# Patient Record
Sex: FEMALE | Race: WHITE | NOT HISPANIC OR LATINO | Employment: OTHER | URBAN - METROPOLITAN AREA
[De-identification: names, ages, dates, MRNs, and addresses within clinical notes are randomized per-mention and may not be internally consistent; named-entity substitution may affect disease eponyms.]

---

## 2021-12-03 ENCOUNTER — OFFICE VISIT (OUTPATIENT)
Dept: ENDOCRINOLOGY | Facility: HOSPITAL | Age: 66
End: 2021-12-03
Payer: MEDICARE

## 2021-12-03 VITALS
HEIGHT: 61 IN | DIASTOLIC BLOOD PRESSURE: 80 MMHG | BODY MASS INDEX: 28.81 KG/M2 | SYSTOLIC BLOOD PRESSURE: 120 MMHG | WEIGHT: 152.6 LBS | HEART RATE: 74 BPM

## 2021-12-03 DIAGNOSIS — E04.1 RIGHT THYROID NODULE: ICD-10-CM

## 2021-12-03 DIAGNOSIS — E04.2 GOITER, NONTOXIC, MULTINODULAR: ICD-10-CM

## 2021-12-03 DIAGNOSIS — E11.42 DIABETIC POLYNEUROPATHY ASSOCIATED WITH TYPE 2 DIABETES MELLITUS (HCC): ICD-10-CM

## 2021-12-03 DIAGNOSIS — E83.52 HYPERCALCEMIA: ICD-10-CM

## 2021-12-03 DIAGNOSIS — E11.9 TYPE 2 DIABETES MELLITUS WITHOUT COMPLICATION, WITHOUT LONG-TERM CURRENT USE OF INSULIN (HCC): Primary | ICD-10-CM

## 2021-12-03 PROCEDURE — 99205 OFFICE O/P NEW HI 60 MIN: CPT | Performed by: INTERNAL MEDICINE

## 2021-12-03 RX ORDER — MULTIVIT WITH MINERALS/LUTEIN
1000 TABLET ORAL DAILY
COMMUNITY

## 2021-12-07 ENCOUNTER — OFFICE VISIT (OUTPATIENT)
Dept: DIABETES SERVICES | Facility: HOSPITAL | Age: 66
End: 2021-12-07
Payer: MEDICARE

## 2021-12-07 VITALS — BODY MASS INDEX: 27.97 KG/M2 | WEIGHT: 152 LBS | HEIGHT: 62 IN

## 2021-12-07 DIAGNOSIS — E04.2 GOITER, NONTOXIC, MULTINODULAR: ICD-10-CM

## 2021-12-07 DIAGNOSIS — E04.1 RIGHT THYROID NODULE: ICD-10-CM

## 2021-12-07 DIAGNOSIS — E11.42 DIABETIC POLYNEUROPATHY ASSOCIATED WITH TYPE 2 DIABETES MELLITUS (HCC): ICD-10-CM

## 2021-12-07 DIAGNOSIS — E83.52 HYPERCALCEMIA: ICD-10-CM

## 2021-12-07 DIAGNOSIS — E11.9 TYPE 2 DIABETES MELLITUS WITHOUT COMPLICATION, WITHOUT LONG-TERM CURRENT USE OF INSULIN (HCC): Primary | ICD-10-CM

## 2021-12-07 PROCEDURE — G0108 DIAB MANAGE TRN  PER INDIV: HCPCS | Performed by: DIETITIAN, REGISTERED

## 2021-12-14 DIAGNOSIS — E11.9 TYPE 2 DIABETES MELLITUS WITHOUT COMPLICATION, WITHOUT LONG-TERM CURRENT USE OF INSULIN (HCC): Primary | ICD-10-CM

## 2021-12-14 RX ORDER — BLOOD-GLUCOSE METER
EACH MISCELLANEOUS
Qty: 1 KIT | Refills: 0 | Status: SHIPPED | OUTPATIENT
Start: 2021-12-14

## 2021-12-14 RX ORDER — LANCETS 30 GAUGE
EACH MISCELLANEOUS
Qty: 100 EACH | Refills: 3 | Status: SHIPPED | OUTPATIENT
Start: 2021-12-14

## 2022-01-27 ENCOUNTER — OFFICE VISIT (OUTPATIENT)
Dept: DIABETES SERVICES | Facility: HOSPITAL | Age: 67
End: 2022-01-27
Payer: MEDICARE

## 2022-01-27 VITALS — WEIGHT: 152 LBS | HEIGHT: 61 IN | BODY MASS INDEX: 28.7 KG/M2

## 2022-01-27 DIAGNOSIS — E11.9 TYPE 2 DIABETES MELLITUS WITHOUT COMPLICATION, WITHOUT LONG-TERM CURRENT USE OF INSULIN (HCC): Primary | ICD-10-CM

## 2022-01-27 PROCEDURE — 97802 MEDICAL NUTRITION INDIV IN: CPT | Performed by: DIETITIAN, REGISTERED

## 2022-01-27 NOTE — PROGRESS NOTES
Medical Nutrition Therapy     Assessment    Visit Type: Initial visit  Chief complaint:  Type 2 Diabetes     HPI:  Met with Autumn Jay for initial medical nutrition therapy visit  Since our diabetes visit, she has started checking her blood sugars, doing some pair testing  Has a handful of days for me to work with  Fasting blood sugars 110-130, before lunch and before dinner often less than 100, after meal blood sugars tend to be higher after breakfast and occasionally after dinner  Blood sugars significantly improved since a few months ago when she was on steroids and having infections  Food recall shows primary issues:  Autumn Jay does not eat overly large portions, but carbohydrate intake very significantly throughout the day  Discussed the benefit of consistent carb intake to steady blood sugars  She seems to grasp the concept of this but making changes to some of her meals may be a challenge  Together we discussed what foods contain CHO, reading a food label, serving sizes, and set a carb goal of 30-45g CHO/meal to promote weight loss with 15g snacks  Put together sample meals for Valerie's reference and evaluated Valerie's current eating plan  Good understanding, Autumn Jay will call with questions or for more education  Follow up as needed if not improving  Ht Readings from Last 1 Encounters:   01/27/22 5' 1" (1 549 m)     Wt Readings from Last 3 Encounters:   01/27/22 68 9 kg (152 lb)   12/07/21 68 9 kg (152 lb)   12/03/21 69 2 kg (152 lb 9 6 oz)        Body mass index is 28 72 kg/m²       Lab Results   Component Value Date    HGBA1C 7 6 11/29/2021       No results found for: CHOL  No results found for: HDL  No results found for: LDLCALC  No results found for: TRIG  No results found for: CHOLHDL    Weight Change: No    Medical Diagnosis/ICD 10 Code:  E11 9    Barriers to Learning: no barriers    Do you follow any special diet presently?: No  Who shops: patient  Who cooks: patient    Food Log: Completed via the method of food recall    Breakfast: up around 7:30, eats 8  Oatmeal with raisins and milk and coffee; eggs 1 toast and coffee; cream of wheat and coffee   Morning Snack: more coffee sometimes with a lot of milk  Lunch: 1:30pm: 1 rye bread larger slice with lunchmeat, tomato sliced and tea or water  peanbut butter on rye with apple on it  Afternoon Snack: not much   Dinner: 6pm: open face burger with vegetable  Chicken with veggies, might not always have a starch  Sometimes pasta with red sauce main dish  Evening Snack: nuts and raisins a little, sometimes nothing  Beverages: water, green tea black, coffee in the morning 2 big cups, no milk, no soda, no juice,   Eating out/Take out: not out much   Exercise ADL, not now in the cold, in the nice weather will walk 4,000 steps  Nutrition Diagnosis:  Food and nutrition related knowledge deficit  related to Lack of prior exposure to accurate nutrition related information as evidenced by Verbalizes inaccurate or incomplete information    Intervention: plate method, label reading, behavior modification strategies, carbohydrate counting, individualized meal plan and monitoring portion control     Treatment Goals: Patient understands education and recommendations    Education Material Given  Terri Garcia was provided the Portion Booklet and Planning Healthy Meals     Monitoring and evaluation:    Term code indicator   1 6 3 Carbohydrate Intake Criteria: 15-30g CHO per meal, 0-15g CHO snacks    Patients Response to Instruction:  Simona Lion  Expected Compliancegood    Thank you for coming to the [place] [address] for education today  Please feel free to call with any questions or concerns      Brie Sanz, [address] [place] [address] 20  29 [facility] [id number]

## 2022-04-05 LAB
ALBUMIN SERPL-MCNC: 4.4 G/DL (ref 3.8–4.8)
ALBUMIN/CREAT UR: <12 MG/G CREAT (ref 0–29)
ALBUMIN/GLOB SERPL: 1.8 {RATIO} (ref 1.2–2.2)
ALP SERPL-CCNC: 127 IU/L (ref 44–121)
ALT SERPL-CCNC: 23 IU/L (ref 0–32)
AST SERPL-CCNC: 29 IU/L (ref 0–40)
BILIRUB SERPL-MCNC: 0.7 MG/DL (ref 0–1.2)
BUN SERPL-MCNC: 17 MG/DL (ref 8–27)
BUN/CREAT SERPL: 20 (ref 12–28)
C PEPTIDE SERPL-MCNC: 1.2 NG/ML (ref 1.1–4.4)
CALCIUM SERPL-MCNC: 10.2 MG/DL (ref 8.7–10.3)
CHLORIDE SERPL-SCNC: 103 MMOL/L (ref 96–106)
CO2 SERPL-SCNC: 22 MMOL/L (ref 20–29)
CREAT SERPL-MCNC: 0.86 MG/DL (ref 0.57–1)
CREAT UR-MCNC: 24.1 MG/DL
EGFR: 74 ML/MIN/1.73
EST. AVERAGE GLUCOSE BLD GHB EST-MCNC: 123 MG/DL
GLOBULIN SER-MCNC: 2.5 G/DL (ref 1.5–4.5)
GLUCOSE SERPL-MCNC: 127 MG/DL (ref 65–99)
HBA1C MFR BLD: 5.9 % (ref 4.8–5.6)
MICROALBUMIN UR-MCNC: <3 UG/ML
PHOSPHATE SERPL-MCNC: 3.4 MG/DL (ref 3–4.3)
POTASSIUM SERPL-SCNC: 4.4 MMOL/L (ref 3.5–5.2)
PROT SERPL-MCNC: 6.9 G/DL (ref 6–8.5)
PTH-INTACT SERPL-MCNC: 42 PG/ML (ref 15–65)
SODIUM SERPL-SCNC: 140 MMOL/L (ref 134–144)

## 2022-04-08 ENCOUNTER — OFFICE VISIT (OUTPATIENT)
Dept: ENDOCRINOLOGY | Facility: HOSPITAL | Age: 67
End: 2022-04-08
Payer: MEDICARE

## 2022-04-08 VITALS
OXYGEN SATURATION: 99 % | BODY MASS INDEX: 27.6 KG/M2 | SYSTOLIC BLOOD PRESSURE: 110 MMHG | DIASTOLIC BLOOD PRESSURE: 78 MMHG | WEIGHT: 146.2 LBS | HEIGHT: 61 IN | HEART RATE: 78 BPM

## 2022-04-08 DIAGNOSIS — E04.2 GOITER, NONTOXIC, MULTINODULAR: ICD-10-CM

## 2022-04-08 DIAGNOSIS — E11.42 DIABETIC POLYNEUROPATHY ASSOCIATED WITH TYPE 2 DIABETES MELLITUS (HCC): ICD-10-CM

## 2022-04-08 DIAGNOSIS — E04.1 RIGHT THYROID NODULE: ICD-10-CM

## 2022-04-08 DIAGNOSIS — E83.52 HYPERCALCEMIA: ICD-10-CM

## 2022-04-08 DIAGNOSIS — E11.9 TYPE 2 DIABETES MELLITUS WITHOUT COMPLICATION, WITHOUT LONG-TERM CURRENT USE OF INSULIN (HCC): Primary | ICD-10-CM

## 2022-04-08 PROCEDURE — 99215 OFFICE O/P EST HI 40 MIN: CPT | Performed by: INTERNAL MEDICINE

## 2022-04-08 RX ORDER — PROPRANOLOL/HYDROCHLOROTHIAZID 40 MG-25MG
TABLET ORAL
COMMUNITY

## 2022-04-08 NOTE — PATIENT INSTRUCTIONS
hgba1c 5 9%, this is excellent ,   For now, no change in metformin  Continue to follow  Blood sugars  Continue to follow the diabetic diet  The calcium levels are normal now, we'll follow it over time  It is ok to follow up with the eye doctor  Follow up in 3 months with blood work

## 2022-04-08 NOTE — PROGRESS NOTES
4/8/2022    Assessment/Plan      Diagnoses and all orders for this visit:    Type 2 diabetes mellitus without complication, without long-term current use of insulin (HCC)  -     metFORMIN (GLUCOPHAGE) 500 mg tablet; Take 1 tablet (500 mg total) by mouth 2 (two) times a day with meals  -     HEMOGLOBIN A1C W/ EAG ESTIMATION Lab Collect; Future  -     Comprehensive metabolic panel Lab Collect; Future  -     Phosphorus Lab Collect; Future    Diabetic polyneuropathy associated with type 2 diabetes mellitus (HCC)  -     HEMOGLOBIN A1C W/ EAG ESTIMATION Lab Collect; Future  -     Comprehensive metabolic panel Lab Collect; Future  -     Phosphorus Lab Collect; Future    Goiter, nontoxic, multinodular  -     HEMOGLOBIN A1C W/ EAG ESTIMATION Lab Collect; Future  -     Comprehensive metabolic panel Lab Collect; Future  -     Phosphorus Lab Collect; Future    Right thyroid nodule  -     HEMOGLOBIN A1C W/ EAG ESTIMATION Lab Collect; Future  -     Comprehensive metabolic panel Lab Collect; Future  -     Phosphorus Lab Collect; Future    Hypercalcemia  -     HEMOGLOBIN A1C W/ EAG ESTIMATION Lab Collect; Future  -     Comprehensive metabolic panel Lab Collect; Future  -     Phosphorus Lab Collect; Future    Other orders  -     Glucosamine-Chondroitin (GLUCOSAMINE CHONDR COMPLEX PO); Take by mouth  -     Turmeric 500 MG CAPS; Take by mouth        Assessment/Plan:  1  Type 2 diabetes  Hemoglobin A1c is 5 9%  This does demonstrate excellent control of her diabetes  She has done remarkably well with her dietary changes and use of low-dose metformin  For now, she will continue the same metformin  mg twice a day  She will continue to work on dietary changes and follow her blood sugars with paired testing  If her hemoglobin A1c remains this low with her next visit, we may be able to consider decreasing her metformin    She has been cleared to follow-up with the ophthalmologist for diabetic eye exam now that her blood sugars are doing much better  2  Diabetic neuropathy  She denies neuropathic symptoms  Diabetic foot exams are up-to-date  3  Nontoxic multinodular goiter with right dominant thyroid nodule  She is post biopsy of a large right-sided thyroid nodule that was biopsy benign  She has a smaller T rads 5 nodule  Repeat thyroid ultrasound will be required in the fall 2022  4  Hypercalcemia  Most recent blood work does show normalization of her calcium with normal parathyroid hormone level  This will be followed over time  I have asked her to follow up in 3 months with preceding hemoglobin A1c, CMP, and phosphorus level  CC: Diabetes type 2, thyroid, calcium follow-up    History of Present Illness     HPI: Dante Mack is a 74y o  year old female with type 2 diabetes neuropathy for 6 months, multinodular goiter, hypercalcemia follow-up  Diabetes occurred after 40% of her pancreas removed, both the body 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 for which another 13 tumors removed in July 2021  She is currently on octreotide once a month  She is on oral agents at home and takes metformin  mg twice a day  She denies any polyuria, polydipsia, and blurry vision  She has some hunger at times and 1-3 times per night nocturia  She denies numbness or tingling of the feet  She denies chest pain or shortness of breath  She denies nephropathy, retinopathy, heart attack, stroke and claudication but does admit to neuropathy  Hypoglycemic episodes: No rare  The patient's last eye exam was many years ago  The patient's last foot exam was in December 2021 at endocrine office visit  Last A1C was   Lab Results   Component Value Date    HGBA1C 5 9 (H) 04/04/2022     Blood Sugar/Glucometer/Pump/CGM review:  Blood sugars are checked up to 4 times a day    She is doing para testing before and several hours after a meal   Blood sugars overall range from 70s to the mid 100s with rare blood sugar readings above 150 or below 70  She was in [facility] for 1 month, not as good with checking sugars there  She has a history of a nontoxic multinodular goiter  Was a right-sided dominant thyroid nodule  This was noted on ultrasound after workup post a neuroendocrine tumor of the pancreas in 2008 with 40% of her pancreas being removed and a PET scan was done in follow-up noting uptake in the thyroid  This occurred in November 2021 when thyroid nodule was discovered  Follicular lesion [place] class 3 with Afirma testing benign was the reports of the T rad 4 3 1 cm right-sided thyroid nodule pathology  There is a small T rads 5 nodule measuring less than 1 cm in the thyroid also  She denies heat or cold intolerance but can be very cold in bed  She denies palpitation, tremors, diarrhea or constipation  She does have insomnia with trouble falling asleep getting back to sleep  She has no fatigue or weight changes  She denies compressive thyroid symptoms or difficulties with swallowing  She has a history of mild hypercalcemia for which thyroid ultrasound did  demonstrate inferior to the left lobe of the thyroid possible parathyroid adenoma  Review of Systems   Constitutional: Negative for fatigue and unexpected weight change  HENT: Negative for trouble swallowing  Eyes: Positive for visual disturbance  Wears reading glasses  Has blurry vision  Respiratory: Negative for chest tightness and shortness of breath  Cardiovascular: Positive for leg swelling  Negative for chest pain and palpitations  Some edema of the feet, left greater than the right  Gastrointestinal: Negative for abdominal pain, constipation, diarrhea and nausea  Will get abdominal symptoms with octreotide and loose bowels and has rectocele  Endocrine: Positive for polyphagia  Negative for cold intolerance, heat intolerance, polydipsia and polyuria          Nocturia once or twice a night  Some hungry at times  Gets cold at night in bed especially hands and feet  Skin: Negative for rash and wound  Neurological: Negative for dizziness, tremors, weakness, light-headedness, numbness and headaches  Psychiatric/Behavioral: Positive for sleep disturbance  Some trouble falling asleep or fall back to sleep if awakens          Historical Information   Past Medical History:   Diagnosis Date    H  pylori infection     Neuroendocrine carcinoma ([facility] [address] ) 2008    pancreas with 40% bodya dn tail removed    Rectocele      Past Surgical History:   Procedure Laterality Date    CHOLECYSTECTOMY  07/2021    with liver surgery    DISTAL PANCREATECTOMY  2008    body and tail removed, 40%    LIVER SURGERY  07/2021    13 liver mets rremoved from pancreatic cancer    TUBAL LIGATION Bilateral     UMBILICAL HERNIA REPAIR  07/2021    with liver surgery     Social History   Social History     Substance and Sexual Activity   Alcohol Use Yes    Comment: rare glass of wine     Social History     Substance and Sexual Activity   Drug Use Never     Social History     Tobacco Use   Smoking Status Never Smoker   Smokeless Tobacco Never Used     Family History:   Family History   Problem Relation Age of Onset    Atrial fibrillation Mother     Retinal detachment Mother     Alcohol abuse Father     Kidney nephrosis Father     No Known Problems Sister     Atrial fibrillation Brother     Cervical cancer Maternal Grandmother     Cervical cancer Paternal Grandmother     Obesity Son     Diabetes type II Son     No Known Problems Son     No Known Problems Daughter     Retinal detachment Sister        Meds/Allergies   Current Outpatient Medications   Medication Sig Dispense Refill    Ascorbic Acid (vitamin C) 1000 MG tablet Take 1,000 mg by mouth daily      Blood Glucose Monitoring Suppl (OneTouch Verio) w/Device KIT Use to test blood sugars once daily 1 kit 0    Glucosamine-Chondroitin (GLUCOSAMINE CHONDR COMPLEX PO) Take by mouth      glucose blood test strip Use  To test blood sugars once daily 100 strip 3    metFORMIN (GLUCOPHAGE) 500 mg tablet Take 1 tablet (500 mg total) by mouth 2 (two) times a day with meals 180 tablet 3    Multiple Vitamins-Minerals (CENTRUM SILVER 50+WOMEN PO) Take 1 tablet by mouth daily      octreotide (SandoSTATIN LAR DEPOT) 30 mg IM injection kit Inject 30 mg into a muscle every 28 days      OneTouch Delica Lancets 16Y MISC Use to test blood sugars once daily 100 each 3    Probiotic Product (PROBIOTIC-10 PO) Take 1 tablet by mouth daily      Turmeric 500 MG CAPS Take by mouth       No current facility-administered medications for this visit  Allergies   Allergen Reactions    Levaquin [Levofloxacin] Other (See Comments)     Tendons       Objective   Vitals: Blood pressure 110/78, pulse 78, height 5' 1" (1 549 m), weight 66 3 kg (146 lb 3 2 oz), SpO2 99 %  Invasive Devices  Report    None                 Physical Exam  Vitals reviewed  Constitutional:       Appearance: Normal appearance  She is well-developed  HENT:      Head: Normocephalic and atraumatic  Eyes:      Extraocular Movements: Extraocular movements intact  Conjunctiva/sclera: Conjunctivae normal       Comments: No lid lag, stare, proptosis, periorbital edema  Neck:      Thyroid: No thyromegaly  Vascular: No carotid bruit  Comments: 3 cm right thyroid nodule palpable  No bruits over the thyroid gland  Cardiovascular:      Rate and Rhythm: Normal rate and regular rhythm  Heart sounds: Normal heart sounds  No murmur heard  Pulmonary:      Effort: Pulmonary effort is normal       Breath sounds: Normal breath sounds  No wheezing  Abdominal:      Palpations: Abdomen is soft  Musculoskeletal:         General: No deformity  Normal range of motion  Cervical back: Normal range of motion and neck supple  Right lower leg: No edema        Left lower leg: No edema  Comments: No tremor of the outstretched hands  Lymphadenopathy:      Cervical: No cervical adenopathy  Skin:     General: Skin is warm and dry  Findings: No rash  Neurological:      Mental Status: She is alert and oriented to person, place, and time  Deep Tendon Reflexes: Reflexes are normal and symmetric  Comments: Patellar deep tendon reflexes normal          The history was obtained from the review of the chart and from the patient and   Lab Results:    Most recent Alc is  Lab Results   Component Value Date    HGBA1C 5 9 (H) 04/04/2022           CMP done on 4/4/2022 shows a fasting glucose of 127, calcium of 10 2 with albumin of 4 4,  and was otherwise normal       Fasting C-Peptide level is 1 2  Phosphorus is 3 4  Intact parathyroid hormone level is 42  Lab Results   Component Value Date    CREATININE 0 86 04/04/2022    BUN 17 04/04/2022    K 4 4 04/04/2022     04/04/2022    CO2 22 04/04/2022     eGFR   Date Value Ref Range Status   04/04/2022 74 >59 mL/min/1 73 Final     Lab Results   Component Value Date    ALT 23 04/04/2022    AST 29 04/04/2022       Urine microalbumin to creatinine ratio is less than 12        Future Appointments   Date Time Provider Arslan Baez   7/27/2022 10:20 AM Carey Jean-Baptiste MD [facility]

## 2022-04-25 LAB
LEFT EYE DIABETIC RETINOPATHY: NORMAL
RIGHT EYE DIABETIC RETINOPATHY: NORMAL

## 2022-07-23 LAB
ALBUMIN SERPL-MCNC: 4.5 G/DL (ref 3.8–4.8)
ALBUMIN/GLOB SERPL: 2 {RATIO} (ref 1.2–2.2)
ALP SERPL-CCNC: 111 IU/L (ref 44–121)
ALT SERPL-CCNC: 22 IU/L (ref 0–32)
AST SERPL-CCNC: 28 IU/L (ref 0–40)
BILIRUB SERPL-MCNC: 0.4 MG/DL (ref 0–1.2)
BUN SERPL-MCNC: 22 MG/DL (ref 8–27)
BUN/CREAT SERPL: 26 (ref 12–28)
CALCIUM SERPL-MCNC: 10.4 MG/DL (ref 8.7–10.3)
CHLORIDE SERPL-SCNC: 104 MMOL/L (ref 96–106)
CO2 SERPL-SCNC: 27 MMOL/L (ref 20–29)
CREAT SERPL-MCNC: 0.85 MG/DL (ref 0.57–1)
EGFR: 75 ML/MIN/1.73
GLOBULIN SER-MCNC: 2.3 G/DL (ref 1.5–4.5)
GLUCOSE SERPL-MCNC: 116 MG/DL (ref 65–99)
HBA1C MFR BLD: 6 % (ref 4.8–5.6)
PHOSPHATE SERPL-MCNC: 3.4 MG/DL (ref 3–4.3)
POTASSIUM SERPL-SCNC: 5.1 MMOL/L (ref 3.5–5.2)
PROT SERPL-MCNC: 6.8 G/DL (ref 6–8.5)
SODIUM SERPL-SCNC: 144 MMOL/L (ref 134–144)

## 2022-07-27 ENCOUNTER — TELEPHONE (OUTPATIENT)
Dept: ADMINISTRATIVE | Facility: OTHER | Age: 67
End: 2022-07-27

## 2022-07-27 ENCOUNTER — OFFICE VISIT (OUTPATIENT)
Dept: ENDOCRINOLOGY | Facility: HOSPITAL | Age: 67
End: 2022-07-27
Payer: MEDICARE

## 2022-07-27 VITALS
SYSTOLIC BLOOD PRESSURE: 120 MMHG | HEIGHT: 61 IN | HEART RATE: 80 BPM | BODY MASS INDEX: 27.38 KG/M2 | DIASTOLIC BLOOD PRESSURE: 72 MMHG | WEIGHT: 145 LBS

## 2022-07-27 DIAGNOSIS — E11.42 DIABETIC POLYNEUROPATHY ASSOCIATED WITH TYPE 2 DIABETES MELLITUS (HCC): ICD-10-CM

## 2022-07-27 DIAGNOSIS — E11.9 TYPE 2 DIABETES MELLITUS WITHOUT COMPLICATION, WITHOUT LONG-TERM CURRENT USE OF INSULIN (HCC): Primary | ICD-10-CM

## 2022-07-27 DIAGNOSIS — E83.52 HYPERCALCEMIA: ICD-10-CM

## 2022-07-27 DIAGNOSIS — E04.1 RIGHT THYROID NODULE: ICD-10-CM

## 2022-07-27 DIAGNOSIS — E04.2 GOITER, NONTOXIC, MULTINODULAR: ICD-10-CM

## 2022-07-27 PROCEDURE — 99214 OFFICE O/P EST MOD 30 MIN: CPT | Performed by: INTERNAL MEDICINE

## 2022-07-27 NOTE — TELEPHONE ENCOUNTER
----- Message from Fred Banks sent at 7/27/2022 10:38 AM EDT -----  Regarding: eye exam  07/27/22 10:39 AM    Hello, our patient Mariam Beasley has had and Eye exam completed in May 2022  Please assist in obtaining the exclusion documentation by making an External outreach to Dr Woodrow Gr MD located in [address]       Thank you,  Manish Khan   [facility]

## 2022-07-27 NOTE — TELEPHONE ENCOUNTER
Upon review of the In Basket request and the patient's chart, initial outreach has been made via fax, please see Contacts section for details       Thank you  Genoveva Naranjo

## 2022-07-27 NOTE — PATIENT INSTRUCTIONS
Hgba1c is 6 0%  this is excellent  Continue the same metformin dosage  Continue  to work on the diet  Continue to test blood sugars at times  Once a week pair testing, before and 2 hours after a meal, vary the meals  The calcium is stable  Follow up in 6 months with blood work

## 2022-07-27 NOTE — LETTER
Diabetic Eye Exam Form    Date Requested: 22  Patient: Shivani Treviño  Patient : 1955   Referring Provider: Sandeep Pyle    DIABETIC Eye Exam Date _______________________________    Type of Exam MUST be documented for Diabetic Eye Exams  Please CHECK ONE  Retinal Exam       Dilated Retinal Exam       OCT       Optomap-Iris Exam      Fundus Photography     Left Eye - Please check Retinopathy AND Type or No Retinopathy      Exam did show retinopathy    Exam did not show retinopathy         Mild     Proliferative           Moderate    Severe            None         Right Eye - Please check Retinopathy AND Type or No Retinopathy     Exam did show retinopathy    Exam did not show retinopathy         Mild     Proliferative        Moderate    Severe        None       Comments __________________________________________________________    Practice Providing Exam ______________________________________________    Exam Performed By (print name) _______________________________________      Provider Signature ___________________________________________________    These reports are needed for  compliance  Please fax this completed form and a copy of the Diabetic Eye Exam report to our office located at [place] [address] as soon as possible via [phone] shanice [place]: Phone [phone]  We thank you for your assistance in treating our mutual patient

## 2022-07-27 NOTE — PROGRESS NOTES
7/27/2022    Assessment/Plan      Diagnoses and all orders for this visit:    Type 2 diabetes mellitus without complication, without long-term current use of insulin ([facility] [address] )  -     HEMOGLOBIN A1C W/ EAG ESTIMATION Lab Collect; Future  -     Comprehensive metabolic panel Lab Collect; Future  -     CBC and differential Lab Collect; Future  -     T4, free Lab Collect; Future  -     TSH, 3rd generation Lab Collect; Future    Diabetic polyneuropathy associated with type 2 diabetes mellitus (HCC)  -     HEMOGLOBIN A1C W/ EAG ESTIMATION Lab Collect; Future  -     Comprehensive metabolic panel Lab Collect; Future  -     CBC and differential Lab Collect; Future  -     T4, free Lab Collect; Future  -     TSH, 3rd generation Lab Collect; Future    Goiter, nontoxic, multinodular  -     HEMOGLOBIN A1C W/ EAG ESTIMATION Lab Collect; Future  -     Comprehensive metabolic panel Lab Collect; Future  -     CBC and differential Lab Collect; Future  -     T4, free Lab Collect; Future  -     TSH, 3rd generation Lab Collect; Future    Right thyroid nodule  -     HEMOGLOBIN A1C W/ EAG ESTIMATION Lab Collect; Future  -     Comprehensive metabolic panel Lab Collect; Future  -     CBC and differential Lab Collect; Future  -     T4, free Lab Collect; Future  -     TSH, 3rd generation Lab Collect; Future    Hypercalcemia  -     HEMOGLOBIN A1C W/ EAG ESTIMATION Lab Collect; Future  -     Comprehensive metabolic panel Lab Collect; Future  -     CBC and differential Lab Collect; Future  -     T4, free Lab Collect; Future  -     TSH, 3rd generation Lab Collect; Future        Assessment/Plan:  1  Type 2 diabetes  Hemoglobin A1c is 6%  This does demonstrate excellent control of her diabetes  At this point, she will continue the same metformin dosage and continue to work on dietary changes  She will continue to test blood sugars at least several times a week by doing pair testing before and 2 hours after meal and then varying the meals      2  Diabetic neuropathy  She has stable neuropathic symptoms  Diabetic foot exams are up-to-date  3  Nontoxic multinodular goiter with right-sided dominant thyroid nodule  4  Hypercalcemia  Calcium level is stable  I have asked her to follow up in 6 months with preceding hemoglobin A1c, CMP, CBC, TSH, and free T4       CC: Diabetes type 2, thyroid, calcium follow-up    History of Present Illness     HPI: Michelle Villanueva is a 74y o  year old female with type 2 diabetes post 40% of her pancreas removed for a neuroendocrine tumor with neuropathy for 8 months, nontoxic multinodular goiter, hypercalcemia for follow-up visit  She is on oral agents at home and takes metformin 500 mg twice a day  She denies any polyuria and polyphagia  She has some thirst, nocturia once or twice a night, and blurry vision  She has unchanged numbness and tingling of the feet  She denies chest pain or shortness of breath  She denies nephropathy, retinopathy, heart attack, stroke and claudication but does admit to neuropathy  Hypoglycemic episodes: No talita  The patient's last eye exam was in May 2022 with no retinopathy, d[place]  The patient's last foot exam was in December 2021 endocrine office visit  Does not see podiatry  Last A1C was   Lab Results   Component Value Date    HGBA1C 6 0 (H) 07/22/2022     Blood Sugar/Glucometer/Pump/CGM review:  Blood sugars are tested pre and 2 hours post meals at times but not on a regular basis  Sugars are primarily 70s to mid 100s with the higher numbers being post meals  She is a history of a nontoxic multinodular goiter with a right-sided dominant thyroid nodule  This was noted on thyroid ultrasound in 2008 in the workup of a neuroendocrine tumor of the pancreas  40% of her pancreas had been removed and a PET scan was done noting uptake in the thyroid    This occurred in November 2021 and a biopsy was performed demonstrating a [place] class 3 lesion with Afirma testing that was benign on the T rad 43 1 cm right-sided thyroid nodule  Of note, there is a TI-RADS 5 nodule measuring less than 1 cm in the thyroid also  Last thyroid ultrasound was in Oct 2021 during this time of the biopsy  Dr Theresa Cai at [facility] has ultrasound of the thyroid in December 2022  She has no compressive thyroid symptoms or difficulties with swallowing  She denies heat or cold intolerance but is on the cold side in general   She denies palpitation or tremors  She is fatigued  Weight is stable  She denies constipation but will have some diarrhea  She does have insomnia with difficulty falling asleep and waking frequently  She has brittle nails and some dry skin but no hair loss  She is a history of mild hypercalcemia for which the thyroid ultrasound did demonstrate a possible lesion inferior to the left lobe of the thyroid that could be a parathyroid adenoma  Her calcium did normalize with a normal parathyroid hormone level  These levels are being followed over time  She has some thirst but no polyuria  She is fatigued  She denies constipation  Review of Systems   Constitutional: Positive for fatigue  Negative for unexpected weight change  Will sleep late some mornings and some fatigue during the day  HENT: Negative for trouble swallowing  Eyes: Positive for visual disturbance  Has blurry vision  Respiratory: Negative for chest tightness and shortness of breath  Cardiovascular: Negative for chest pain and palpitations  Will have a rare episode of heart beating faster  Gastrointestinal: Positive for diarrhea  Negative for abdominal pain, constipation and nausea  Typically has diarrhea  Endocrine: Positive for cold intolerance and polydipsia  Negative for heat intolerance, polyphagia and polyuria  Nocturia  1-2 times a night  Some thirst tends to be on the colder side  Skin: Negative for rash and wound  Some dry skin  Has brittle nails  No hair loss, thinning on top in general for a while  Neurological: Positive for numbness  Negative for dizziness, tremors, weakness, light-headedness and headaches  Some numbness and tingling of the feet, left greater than right  Psychiatric/Behavioral: Positive for sleep disturbance  Can wake at night and be awake for several hours  Some difficulty falling asleep          Historical Information   Past Medical History:   Diagnosis Date    H  pylori infection     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 with 40% bodya dn tail removed    Rectocele      Past Surgical History:   Procedure Laterality Date    CHOLECYSTECTOMY  07/2021    with liver surgery    DISTAL PANCREATECTOMY  2008    body and tail removed, 40%    LIVER SURGERY  07/2021    13 liver mets rremoved from pancreatic cancer    TUBAL LIGATION Bilateral     UMBILICAL HERNIA REPAIR  07/2021    with liver surgery     Social History   Social History     Substance and Sexual Activity   Alcohol Use Yes    Comment: rare glass of wine     Social History     Substance and Sexual Activity   Drug Use Never     Social History     Tobacco Use   Smoking Status Never Smoker   Smokeless Tobacco Never Used     Family History:   Family History   Problem Relation Age of Onset    Atrial fibrillation Mother     Retinal detachment Mother     Alcohol abuse Father     Kidney nephrosis Father     No Known Problems Sister     Atrial fibrillation Brother     Cervical cancer Maternal Grandmother     Cervical cancer Paternal Grandmother     Obesity Son     Diabetes type II Son     No Known Problems Son     No Known Problems Daughter     Retinal detachment Sister        Meds/Allergies   Current Outpatient Medications   Medication Sig Dispense Refill    Ascorbic Acid (vitamin C) 1000 MG tablet Take 1,000 mg by mouth daily      Blood Glucose Monitoring Suppl (OneTouch Verio) w/Device KIT Use to test blood sugars once daily 1 kit 0    Glucosamine-Chondroitin (GLUCOSAMINE CHONDR COMPLEX PO) Take by mouth      glucose blood test strip Use  To test blood sugars once daily 100 strip 3    metFORMIN (GLUCOPHAGE) 500 mg tablet Take 1 tablet (500 mg total) by mouth 2 (two) times a day with meals 180 tablet 3    Multiple Vitamins-Minerals (CENTRUM SILVER 50+WOMEN PO) Take 1 tablet by mouth daily      octreotide (SandoSTATIN LAR DEPOT) 30 mg IM injection kit Inject 30 mg into a muscle every 28 days      OneTouch Delica Lancets 53Q MISC Use to test blood sugars once daily 100 each 3    Probiotic Product (PROBIOTIC-10 PO) Take 1 tablet by mouth daily      Turmeric 500 MG CAPS Take by mouth       No current facility-administered medications for this visit  Allergies   Allergen Reactions    Levaquin [Levofloxacin] Other (See Comments)     Tendons       Objective   Vitals: Blood pressure 120/72, pulse 80, height 5' 1" (1 549 m), weight 65 8 kg (145 lb)  Invasive Devices  Report    None                 Physical Exam  Vitals reviewed  Constitutional:       Appearance: Normal appearance  She is well-developed  She is obese  HENT:      Head: Normocephalic and atraumatic  Eyes:      Extraocular Movements: Extraocular movements intact  Conjunctiva/sclera: Conjunctivae normal       Comments: No lid lag, stare, proptosis, or periorbital edema  Neck:      Thyroid: No thyromegaly  Vascular: No carotid bruit  Comments: Thyroid irregular in feel  No palpable thyroid nodules  No bruits over the thyroid gland  Cardiovascular:      Rate and Rhythm: Normal rate and regular rhythm  Heart sounds: Normal heart sounds  No murmur heard  Pulmonary:      Effort: Pulmonary effort is normal       Breath sounds: Normal breath sounds  No wheezing  Abdominal:      Palpations: Abdomen is soft  Musculoskeletal:         General: No deformity  Normal range of motion        Cervical back: Normal range of motion and neck supple  Right lower leg: No edema  Left lower leg: No edema  Comments: No tremor of the outstretched hands   Lymphadenopathy:      Cervical: No cervical adenopathy  Skin:     General: Skin is warm and dry  Findings: No rash  Neurological:      Mental Status: She is alert and oriented to person, place, and time  Deep Tendon Reflexes: Reflexes are normal and symmetric  Comments: Patellar deep tendon reflexes normal          The history was obtained from the review of the chart and from the patient and   Lab Results:    Most recent Alc is  Lab Results   Component Value Date    HGBA1C 6 0 (H) 07/22/2022           Blood work done on 07/22/2022 showed a CMP with a glucose of 116 fasting, calcium 10 4 with albumin of 4 5 which corrects her calcium to 10  Phosphorus is 3 4    Lab Results   Component Value Date    CREATININE 0 85 07/22/2022    CREATININE 0 86 04/04/2022    BUN 22 07/22/2022    K 5 1 07/22/2022     07/22/2022    CO2 27 07/22/2022     eGFR   Date Value Ref Range Status   07/22/2022 75 >59 mL/min/1 73 Final         Lab Results   Component Value Date    ALT 22 07/22/2022    AST 28 07/22/2022           Future Appointments   Date Time Provider Arslan Baez   1/25/2023 11:40 AM Maxim Mott MD [facility]

## 2022-08-02 NOTE — TELEPHONE ENCOUNTER
As a follow-up, a second attempt has been made for outreach via fax, please see Contacts section for details      Thank you  Peggy Oneil

## 2022-08-03 NOTE — TELEPHONE ENCOUNTER
Upon review of the In Basket request we were able to locate, review, and update the patient chart as requested for Diabetic Eye Exam     Any additional questions or concerns should be emailed to the Practice Liaisons via [email or web address] email, please do not reply via In Basket      Thank you  Rui Andujar

## 2023-01-21 LAB
ALBUMIN SERPL-MCNC: 4.5 G/DL (ref 3.8–4.8)
ALBUMIN/GLOB SERPL: 2 {RATIO} (ref 1.2–2.2)
ALP SERPL-CCNC: 92 IU/L (ref 44–121)
ALT SERPL-CCNC: 18 IU/L (ref 0–32)
AST SERPL-CCNC: 24 IU/L (ref 0–40)
BASOPHILS # BLD AUTO: 0 X10E3/UL (ref 0–0.2)
BASOPHILS NFR BLD AUTO: 1 %
BILIRUB SERPL-MCNC: 0.6 MG/DL (ref 0–1.2)
BUN SERPL-MCNC: 19 MG/DL (ref 8–27)
BUN/CREAT SERPL: 22 (ref 12–28)
CALCIUM SERPL-MCNC: 10.1 MG/DL (ref 8.7–10.3)
CHLORIDE SERPL-SCNC: 100 MMOL/L (ref 96–106)
CO2 SERPL-SCNC: 23 MMOL/L (ref 20–29)
CREAT SERPL-MCNC: 0.85 MG/DL (ref 0.57–1)
EGFR: 74 ML/MIN/1.73
EOSINOPHIL # BLD AUTO: 0.2 X10E3/UL (ref 0–0.4)
EOSINOPHIL NFR BLD AUTO: 5 %
ERYTHROCYTE [DISTWIDTH] IN BLOOD BY AUTOMATED COUNT: 13.2 % (ref 11.7–15.4)
EST. AVERAGE GLUCOSE BLD GHB EST-MCNC: 120 MG/DL
GLOBULIN SER-MCNC: 2.3 G/DL (ref 1.5–4.5)
GLUCOSE SERPL-MCNC: 100 MG/DL (ref 70–99)
HBA1C MFR BLD: 5.8 % (ref 4.8–5.6)
HCT VFR BLD AUTO: 41.5 % (ref 34–46.6)
HGB BLD-MCNC: 13.8 G/DL (ref 11.1–15.9)
IMM GRANULOCYTES # BLD: 0 X10E3/UL (ref 0–0.1)
IMM GRANULOCYTES NFR BLD: 0 %
LYMPHOCYTES # BLD AUTO: 1.3 X10E3/UL (ref 0.7–3.1)
LYMPHOCYTES NFR BLD AUTO: 31 %
MCH RBC QN AUTO: 28.4 PG (ref 26.6–33)
MCHC RBC AUTO-ENTMCNC: 33.3 G/DL (ref 31.5–35.7)
MCV RBC AUTO: 85 FL (ref 79–97)
MONOCYTES # BLD AUTO: 0.2 X10E3/UL (ref 0.1–0.9)
MONOCYTES NFR BLD AUTO: 5 %
NEUTROPHILS # BLD AUTO: 2.4 X10E3/UL (ref 1.4–7)
NEUTROPHILS NFR BLD AUTO: 58 %
PLATELET # BLD AUTO: 180 X10E3/UL (ref 150–450)
POTASSIUM SERPL-SCNC: 4.3 MMOL/L (ref 3.5–5.2)
PROT SERPL-MCNC: 6.8 G/DL (ref 6–8.5)
RBC # BLD AUTO: 4.86 X10E6/UL (ref 3.77–5.28)
SODIUM SERPL-SCNC: 138 MMOL/L (ref 134–144)
T4 FREE SERPL-MCNC: 0.93 NG/DL (ref 0.82–1.77)
TSH SERPL DL<=0.005 MIU/L-ACNC: 1.91 UIU/ML (ref 0.45–4.5)
WBC # BLD AUTO: 4 X10E3/UL (ref 3.4–10.8)

## 2023-01-25 ENCOUNTER — OFFICE VISIT (OUTPATIENT)
Dept: ENDOCRINOLOGY | Facility: HOSPITAL | Age: 68
End: 2023-01-25

## 2023-01-25 VITALS
SYSTOLIC BLOOD PRESSURE: 126 MMHG | BODY MASS INDEX: 29.15 KG/M2 | DIASTOLIC BLOOD PRESSURE: 82 MMHG | HEIGHT: 61 IN | HEART RATE: 80 BPM | WEIGHT: 154.4 LBS

## 2023-01-25 DIAGNOSIS — E83.52 HYPERCALCEMIA: ICD-10-CM

## 2023-01-25 DIAGNOSIS — E04.1 RIGHT THYROID NODULE: ICD-10-CM

## 2023-01-25 DIAGNOSIS — E04.2 GOITER, NONTOXIC, MULTINODULAR: ICD-10-CM

## 2023-01-25 DIAGNOSIS — E11.42 DIABETIC POLYNEUROPATHY ASSOCIATED WITH TYPE 2 DIABETES MELLITUS (HCC): ICD-10-CM

## 2023-01-25 DIAGNOSIS — E11.9 TYPE 2 DIABETES MELLITUS WITHOUT COMPLICATION, WITHOUT LONG-TERM CURRENT USE OF INSULIN (HCC): Primary | ICD-10-CM

## 2023-01-25 NOTE — PATIENT INSTRUCTIONS
Hgba1c is 5 8%  this is excellent  Continue the same metformin  ,     The thyroid blood work is normal     The thyroid ultrasound is stable  We'll follow over time  The calcium is normal     Follow up in early June 2023 with blood work

## 2023-01-25 NOTE — PROGRESS NOTES
1/25/2023    Assessment/Plan      Diagnoses and all orders for this visit:    Type 2 diabetes mellitus without complication, without long-term current use of insulin ([place] [id number] )  -     HEMOGLOBIN A1C W/ EAG ESTIMATION Lab Collect; Future  -     Comprehensive metabolic panel Lab Collect; Future  -     TSH, 3rd generation Lab Collect; Future  -     T4, free Lab Collect; Future  -     Lipid Panel with Direct LDL reflex Lab Collect; Future  -     Microalbumin / creatinine urine ratio Lab Collect; Future  -     metFORMIN (GLUCOPHAGE) 500 mg tablet; Take 1 tablet (500 mg total) by mouth 2 (two) times a day with meals    Diabetic polyneuropathy associated with type 2 diabetes mellitus ([place] [id number] )  -     HEMOGLOBIN A1C W/ EAG ESTIMATION Lab Collect; Future  -     Comprehensive metabolic panel Lab Collect; Future  -     TSH, 3rd generation Lab Collect; Future  -     T4, free Lab Collect; Future  -     Lipid Panel with Direct LDL reflex Lab Collect; Future  -     Microalbumin / creatinine urine ratio Lab Collect; Future    Goiter, nontoxic, multinodular  -     HEMOGLOBIN A1C W/ EAG ESTIMATION Lab Collect; Future  -     Comprehensive metabolic panel Lab Collect; Future  -     TSH, 3rd generation Lab Collect; Future  -     T4, free Lab Collect; Future  -     Lipid Panel with Direct LDL reflex Lab Collect; Future  -     Microalbumin / creatinine urine ratio Lab Collect; Future    Right thyroid nodule  -     HEMOGLOBIN A1C W/ EAG ESTIMATION Lab Collect; Future  -     Comprehensive metabolic panel Lab Collect; Future  -     TSH, 3rd generation Lab Collect; Future  -     T4, free Lab Collect; Future  -     Lipid Panel with Direct LDL reflex Lab Collect; Future  -     Microalbumin / creatinine urine ratio Lab Collect; Future    Hypercalcemia  -     HEMOGLOBIN A1C W/ EAG ESTIMATION Lab Collect; Future  -     Comprehensive metabolic panel Lab Collect; Future  -     TSH, 3rd generation Lab Collect; Future  -     T4, free Lab Collect;  Future  - Lipid Panel with Direct LDL reflex Lab Collect; Future  -     Microalbumin / creatinine urine ratio Lab Collect; Future        Assessment/Plan:  1  Type 2 diabetes  Hemoglobin A1c is 5 8% demonstrating excellent control of her diabetes  For now, she will continue the same metformin 500 mg twice a day  2   Diabetic neuropathy  She has neuropathic symptoms unchanged  Diabetic foot exams was performed in the office today  3   Nontoxic multinodular goiter with right sided dominant thyroid nodule  Most recent thyroid function studies are normal   She is biochemically euthyroid  Thyroid ultrasound is stable with no change in thyroid nodule sizes  This will continue to be followed over time  4   Hypercalcemia  Most recent calcium level is normal     I have asked her to follow-up in June 2023 with preceding hemoglobin A1c, CMP, TSH, free T4, lipid panel, and urine microalbumin to creatinine ratio  CC: Diabetes type II, thyroid, calcium follow-up    History of Present Illness     HPI: Ruel Noyola is a 69y o  year old female with type 2 diabetes for 1 years, nontoxic multinodular goiter, hypercalcemia for follow-up visit  She was diagnosed with a neuroendocrine tumor of the pancreas in 2008 and 40% of her pancreas was removed, both the body and the tail  She had surgery in July 2021 to 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  She had been prediabetic for many years but developed diabetes post the surgery in October 2021  She is on oral agents at home and takes metformin 500 mg twice daily  She denies any polyphagia, polydipsia, and blurry vision  She has polyuria and once a night nocturia  She has numbness of the feet, left is worse than the right  She denies chest pain or shortness of breath  She denies nephropathy, retinopathy, heart attack, stroke and claudication but does admit to neuropathy  Hypoglycemic episodes: No rare      The patient's last eye exam was in May 2022 with no retinopathy  The patient's last foot exam was in December 2021 at endocrine office visit  Last A1C was   Lab Results   Component Value Date    HGBA1C 5 8 (H) 01/20/2023     Blood Sugar/Glucometer/Pump/CGM review: will check blood sugars infrequently  usually in the low 100s  She has a history of a nontoxic multinodular goiter with a right sided dominant thyroid nodule  This was noted on thyroid ultrasound in 2008 in the workup of a neuroendocrine tumor of the pancreas  40% of her pancreas had been removed and a PET scan was done noting uptake in the thyroid  This occurred in November 2021 and a biopsy was performed demonstrating a [place] class 3 lesion with Afirma testing that was benign on the T rad 4 3 1 cm right-sided thyroid nodule  Of note, there is a TI-RADS 5 nodule measuring less than 1 cm in the thyroid also  Last thyroid ultrasound was in Oct 2021 during this time of the biopsy  Dr Berhane Kellogg at [facility] has ultrasound of the thyroid scheduled in December 2022  He is leaving [facility] now  She does tend to be on the cold side and also tends to looser stools  She denies tremors, heat intolerance, palpitation, or weight changes  She is fatigued as she is not sleeping as well  She tends to have trouble falling asleep and then wakes at 4 AM and cannot get back to sleep for some time  She denies constipation, anxiety or depression  She denies diplopia  She denies compressive thyroid symptoms or difficulties with swallowing  She is a history of mild hypercalcemia for which the thyroid ultrasound did demonstrate a possible lesion inferior to the left lobe of the thyroid that could be a parathyroid adenoma  Her calcium did normalize with a normal parathyroid hormone level  These levels are being followed over time  Review of Systems   Constitutional: Positive for fatigue  Negative for unexpected weight change          Fatigue when not sleeping as well  HENT: Negative for trouble swallowing  Eyes: Negative for visual disturbance  Respiratory: Negative for chest tightness and shortness of breath  Cardiovascular: Negative for chest pain and palpitations  Gastrointestinal: Positive for diarrhea  Negative for abdominal pain, constipation and nausea  Tends to be on the side closer to diarrhea  Endocrine: Positive for cold intolerance and polyuria  Negative for heat intolerance, polydipsia and polyphagia  Nocturia  Once a night  Can be cold easily  Skin: Negative for rash and wound  Neurological: Positive for numbness  Negative for dizziness, tremors, weakness, light-headedness and headaches  Has feet numbness, left greater than the right  Psychiatric/Behavioral: Positive for sleep disturbance  Negative for dysphoric mood  The patient is not nervous/anxious  Can't fall asleep and will wake at 4 am ad up for 1 hour, mind races with family stress         Historical Information   Past Medical History:   Diagnosis Date   • H  pylori infection    • Neuroendocrine carcinoma ([facility] [address] ) 2008    pancreas with 40% bodya dn tail removed   • Rectocele      Past Surgical History:   Procedure Laterality Date   • CHOLECYSTECTOMY  07/2021    with liver surgery   • DISTAL PANCREATECTOMY  2008    body and tail removed, 40%   • LIVER SURGERY  07/2021    13 liver mets rremoved from pancreatic cancer   • TUBAL LIGATION Bilateral    • UMBILICAL HERNIA REPAIR  07/2021    with liver surgery     Social History   Social History     Substance and Sexual Activity   Alcohol Use Yes    Comment: rare glass of wine     Social History     Substance and Sexual Activity   Drug Use Never     Social History     Tobacco Use   Smoking Status Never   Smokeless Tobacco Never     Family History:   Family History   Problem Relation Age of Onset   • Atrial fibrillation Mother    • Retinal detachment Mother    • Alcohol abuse Father    • Kidney nephrosis Father    • No Known Problems Sister    • Atrial fibrillation Brother    • Cervical cancer Maternal Grandmother    • Cervical cancer Paternal Grandmother    • Obesity Son    • Diabetes type II Son    • No Known Problems Son    • No Known Problems Daughter    • Retinal detachment Sister        Meds/Allergies   Current Outpatient Medications   Medication Sig Dispense Refill   • Ascorbic Acid (vitamin C) 1000 MG tablet Take 1,000 mg by mouth daily     • Blood Glucose Monitoring Suppl (OneTouch Verio) w/Device KIT Use to test blood sugars once daily 1 kit 0   • Glucosamine-Chondroitin (GLUCOSAMINE CHONDR COMPLEX PO) Take by mouth     • glucose blood test strip Use  To test blood sugars once daily 100 strip 3   • metFORMIN (GLUCOPHAGE) 500 mg tablet Take 1 tablet (500 mg total) by mouth 2 (two) times a day with meals 180 tablet 3   • Multiple Vitamins-Minerals (CENTRUM SILVER 50+WOMEN PO) Take 1 tablet by mouth daily     • octreotide (SandoSTATIN LAR DEPOT) 30 mg IM injection kit Inject 30 mg into a muscle every 28 days     • OneTouch Delica Lancets 17G MISC Use to test blood sugars once daily 100 each 3   • Probiotic Product (PROBIOTIC-10 PO) Take 1 tablet by mouth daily     • Turmeric 500 MG CAPS Take by mouth       No current facility-administered medications for this visit  Allergies   Allergen Reactions   • Levaquin [Levofloxacin] Other (See Comments)     Tendons       Objective   Vitals: Blood pressure 126/82, pulse 80, height 5' 1" (1 549 m), weight 70 kg (154 lb 6 4 oz)  Invasive Devices     None                 Physical Exam  Vitals reviewed  Constitutional:       Appearance: Normal appearance  She is well-developed  HENT:      Head: Normocephalic and atraumatic  Eyes:      Conjunctiva/sclera: Conjunctivae normal    Neck:      Thyroid: No thyromegaly  Vascular: No carotid bruit  Comments: Thyroid irregular with right of the thyroid larger than the left     Cardiovascular:      Rate and Rhythm: Normal rate and regular rhythm  Pulses: Pulses are weak  Dorsalis pedis pulses are 1+ on the right side and 1+ on the left side  Posterior tibial pulses are 1+ on the right side and 1+ on the left side  Heart sounds: Normal heart sounds  No murmur heard  Comments: 1+ dorsalis pedis and posterior tibialis pulses bilaterally  Pulmonary:      Effort: Pulmonary effort is normal       Breath sounds: Normal breath sounds  No wheezing  Abdominal:      Palpations: Abdomen is soft  Musculoskeletal:         General: No deformity  Cervical back: Normal range of motion and neck supple  Right lower leg: No edema  Left lower leg: No edema  Comments: No tremor of the outstretched hands  No ulcerations of the feet  Feet:      Right foot:      Skin integrity: No ulcer, skin breakdown, erythema, warmth, callus or dry skin  Left foot:      Skin integrity: No ulcer, skin breakdown, erythema, warmth, callus or dry skin  Lymphadenopathy:      Cervical: No cervical adenopathy  Skin:     General: Skin is warm and dry  Findings: No rash  Neurological:      Mental Status: She is alert and oriented to person, place, and time  Deep Tendon Reflexes: Reflexes are normal and symmetric  Comments: Vibration sensation diminished to the first toe DIP joint bilaterally  achilles did not reflexes intact  Microfilament sensation intact bilaterally  Patient's shoes and socks removed  Right Foot/Ankle   Right Foot Inspection  Skin Exam: skin normal and skin intact  No dry skin, no warmth, no callus, no erythema, no maceration, no abnormal color, no pre-ulcer, no ulcer and no callus  Toe Exam: No swelling and  no right toe deformity    Sensory   Vibration: diminished  Monofilament testing: intact    Vascular  Capillary refills: < 3 seconds  The right DP pulse is 1+  The right PT pulse is 1+       Left Foot/Ankle  Left Foot Inspection  Skin Exam: skin normal and skin intact  No dry skin, no warmth, no erythema, no maceration, normal color, no pre-ulcer, no ulcer and no callus  Toe Exam: No swelling and no left toe deformity  Sensory   Vibration: diminished  Monofilament testing: intact    Vascular  Capillary refills: < 3 seconds  The left DP pulse is 1+  The left PT pulse is 1+  Assign Risk Category  No deformity present  Loss of protective sensation  Weak pulses  Risk: 2        The history was obtained from the review of the chart and from the patient and   Lab Results:    Most recent Alc is  Lab Results   Component Value Date    HGBA1C 5 8 (H) 01/20/2023           Blood work done on 1/20/2023 showed a CMP with a glucose of 100 fasting but was otherwise normal     CBC is normal     Lab Results   Component Value Date    CREATININE 0 85 01/20/2023    CREATININE 0 85 07/22/2022    CREATININE 0 86 04/04/2022    BUN 19 01/20/2023    K 4 3 01/20/2023     01/20/2023    CO2 23 01/20/2023     eGFR   Date Value Ref Range Status   01/20/2023 74 >59 mL/min/1 73 Final         Lab Results   Component Value Date    ALT 18 01/20/2023    AST 24 01/20/2023       Lab Results   Component Value Date    TSH 1 910 01/20/2023    FREET4 0 93 01/20/2023     Thyroid ultrasound:    THYROID ULTRASOUND performed on 12/19/2022    CLINICAL HISTORY: 69 years Female thyroid nodules     COMPARISON: 10/29/2021     TECHNIQUE: Ultrasound of the thyroid gland was performed  Color Doppler was utilized  FINDINGS:     The RIGHT thyroid lobe measures 5 3 x 2 1 x 3 3 cm  The LEFT thyroid lobe measures 4 1 x 1 1 x 1 1 cm  The thyroid isthmus measures 0 2 cm  The thyroid gland is heterogeneous in echotexture and normal in vascularity  Thyroid nodules: Multiple right-sided thyroid nodules with representative measurements as below       Right -   N1: Nodule in the mid to inferior portion measures 3 7 x 2 1 x 3 4 cm, previously 3 0 x 1 7 x 2 8 cm   Composition: solid or almost completely solid (2 points)   Echogenicity: isoechoic (1 point)   Shape: wider-than-tall (0 points)   Margin: smooth (0 points)   Echogenic foci: none (0 points)   Points: 3 points   TI-RADS level: TR3     N2: Nodule in the mid portion measures 1 0 x 0 7 cm, previously 0 9 x 0 6 x 0 8 cm   Composition: solid or almost completely solid (2 points)   Echogenicity: isoechoic (1 point)   Shape: wider-than-tall (0 points)   Margin: smooth (0 points)   Echogenic foci: punctate echogenic foci (3 points)   Points: 6 points   TI-RADS level: TR4     N3: Nodule in the superior portion measures 1 2 x 0 7 x 0 7 cm, previously 1 3 x 0 8 x 0 7 cm   Composition: solid or almost completely solid (2 points)   Echogenicity: hypoechoic (2 points)   Shape: wider-than-tall (0 points)   Margin: smooth (0 points)   Echogenic foci: none (0 points)   Points: 4 points   TI-RADS level: TR4     Left -   Redemonstration of a relatively hypoechoic soft tissue structure posterior to the left thyroid lobe upper pole measuring 1 1 x 0 7 x 0 5 cm, previously 0 9 x 0 5 x 0 4 cm  This probably representing a parathyroid gland  Suggest correlation with PTH      LYMPH NODES: No abnormal-appearing cervical lymph nodes are identified    IMPRESSION:     1  Redemonstration of multiple right-sided thyroid nodules  Allowing technical difference there is no significant interval change  The largest is a TI-RADS 3 nodule on current exam and is the criteria for FNA biopsy  2 A 1 1 cm hypoechoic structure posterior to the left thyroid upper pole, probably representing a prominent parathyroid gland  Suggest correlation with PTH           Future Appointments   Date Time Provider Arslan Baez   6/12/2023 11:20 AM Ethel Neil, [address] [facility]

## 2023-06-06 ENCOUNTER — TELEPHONE (OUTPATIENT)
Dept: ENDOCRINOLOGY | Facility: HOSPITAL | Age: 68
End: 2023-06-06

## 2023-06-06 NOTE — TELEPHONE ENCOUNTER
Patient left voicemail, will be having fasting lab work soon, reports the earliest she can get in at 10:30am, would like to know if she should take her metformin prior to the lab work or not?

## 2023-06-07 LAB
ALBUMIN SERPL-MCNC: 4.8 G/DL (ref 3.8–4.8)
ALBUMIN/CREAT UR: <8 MG/G CREAT (ref 0–29)
ALBUMIN/GLOB SERPL: 2.1 {RATIO} (ref 1.2–2.2)
ALP SERPL-CCNC: 101 IU/L (ref 44–121)
ALT SERPL-CCNC: 20 IU/L (ref 0–32)
AST SERPL-CCNC: 27 IU/L (ref 0–40)
BILIRUB SERPL-MCNC: 0.4 MG/DL (ref 0–1.2)
BUN SERPL-MCNC: 12 MG/DL (ref 8–27)
BUN/CREAT SERPL: 15 (ref 12–28)
CALCIUM SERPL-MCNC: 10.2 MG/DL (ref 8.7–10.3)
CHLORIDE SERPL-SCNC: 100 MMOL/L (ref 96–106)
CHOLEST SERPL-MCNC: 173 MG/DL (ref 100–199)
CO2 SERPL-SCNC: 24 MMOL/L (ref 20–29)
CREAT SERPL-MCNC: 0.81 MG/DL (ref 0.57–1)
CREAT UR-MCNC: 35.8 MG/DL
EGFR: 79 ML/MIN/1.73
EST. AVERAGE GLUCOSE BLD GHB EST-MCNC: 120 MG/DL
GLOBULIN SER-MCNC: 2.3 G/DL (ref 1.5–4.5)
GLUCOSE SERPL-MCNC: 113 MG/DL (ref 70–99)
HBA1C MFR BLD: 5.8 % (ref 4.8–5.6)
HDLC SERPL-MCNC: 67 MG/DL
LDLC SERPL CALC-MCNC: 88 MG/DL (ref 0–99)
LDLC/HDLC SERPL: 1.3 RATIO (ref 0–3.2)
MICROALBUMIN UR-MCNC: <3 UG/ML
POTASSIUM SERPL-SCNC: 4.2 MMOL/L (ref 3.5–5.2)
PROT SERPL-MCNC: 7.1 G/DL (ref 6–8.5)
SL AMB VLDL CHOLESTEROL CALC: 18 MG/DL (ref 5–40)
SODIUM SERPL-SCNC: 136 MMOL/L (ref 134–144)
T4 FREE SERPL-MCNC: 1.02 NG/DL (ref 0.82–1.77)
TRIGL SERPL-MCNC: 97 MG/DL (ref 0–149)
TSH SERPL DL<=0.005 MIU/L-ACNC: 2.4 UIU/ML (ref 0.45–4.5)

## 2023-06-12 ENCOUNTER — OFFICE VISIT (OUTPATIENT)
Dept: ENDOCRINOLOGY | Facility: HOSPITAL | Age: 68
End: 2023-06-12
Payer: MEDICARE

## 2023-06-12 VITALS
WEIGHT: 165 LBS | BODY MASS INDEX: 31.15 KG/M2 | SYSTOLIC BLOOD PRESSURE: 120 MMHG | HEART RATE: 73 BPM | DIASTOLIC BLOOD PRESSURE: 80 MMHG | HEIGHT: 61 IN

## 2023-06-12 DIAGNOSIS — E04.2 GOITER, NONTOXIC, MULTINODULAR: ICD-10-CM

## 2023-06-12 DIAGNOSIS — E11.9 TYPE 2 DIABETES MELLITUS WITHOUT COMPLICATION, WITHOUT LONG-TERM CURRENT USE OF INSULIN (HCC): Primary | ICD-10-CM

## 2023-06-12 DIAGNOSIS — E11.42 DIABETIC POLYNEUROPATHY ASSOCIATED WITH TYPE 2 DIABETES MELLITUS (HCC): ICD-10-CM

## 2023-06-12 DIAGNOSIS — E04.1 RIGHT THYROID NODULE: ICD-10-CM

## 2023-06-12 DIAGNOSIS — E83.52 HYPERCALCEMIA: ICD-10-CM

## 2023-06-12 PROBLEM — D3A.8 NEUROENDOCRINE TUMOR OF PANCREAS: Status: ACTIVE | Noted: 2023-06-12

## 2023-06-12 PROCEDURE — 99214 OFFICE O/P EST MOD 30 MIN: CPT | Performed by: INTERNAL MEDICINE

## 2023-06-12 NOTE — PATIENT INSTRUCTIONS
Hgba1c is 5 8%  this is excellent  Continue the same metformin  The rest of the blood work is normal     Follow up in 6 months with blood work and  thyroid ultrasound to be in jan 2024

## 2023-06-12 NOTE — PROGRESS NOTES
6/12/2023    Assessment/Plan      Diagnoses and all orders for this visit:    Type 2 diabetes mellitus without complication, without long-term current use of insulin ([facility] [address] )  -     HEMOGLOBIN A1C W/ EAG ESTIMATION Lab Collect; Future  -     Comprehensive metabolic panel Lab Collect; Future    Diabetic polyneuropathy associated with type 2 diabetes mellitus (HCC)  -     HEMOGLOBIN A1C W/ EAG ESTIMATION Lab Collect; Future  -     Comprehensive metabolic panel Lab Collect; Future    Goiter, nontoxic, multinodular  -     HEMOGLOBIN A1C W/ EAG ESTIMATION Lab Collect; Future  -     Comprehensive metabolic panel Lab Collect; Future    Right thyroid nodule  -     HEMOGLOBIN A1C W/ EAG ESTIMATION Lab Collect; Future  -     Comprehensive metabolic panel Lab Collect; Future    Hypercalcemia  -     HEMOGLOBIN A1C W/ EAG ESTIMATION Lab Collect; Future  -     Comprehensive metabolic panel Lab Collect; Future    Other orders  -     psyllium (METAMUCIL) 0 52 g capsule; Take 0 52 g by mouth daily        Assessment/Plan:  1  Type 2 diabetes  A1c is 5 8%  This does demonstrate excellent control of her diabetes  For now, she will continue the same metformin 500 mg twice daily  She will continue to work on dietary changes  2   Diabetic neuropathy  She denies neuropathic symptoms  Diabetic foot exams are up-to-date  3   Nontoxic multinodular goiter  She denies compressive thyroid symptoms or difficulties with swallowing  Repeat thyroid ultrasound this coming due soon  4   Hypercalcemia  Calcium  levels are stable  I have asked her to follow-up in 6 months with preceding hemoglobin A1c and CMP  CC: Diabetes type II, thyroid, calcium follow-up    History of Present Illness     HPI: Taylor Rojas is a 69y o  year old female with type 2 diabetes with neuropathy for 1 years nontoxic multinodular goiter, hypercalcemia for follow-up visit   She was diagnosed with a neuroendocrine tumor of the pancreas in 2008 and 40% of her pancreas was removed, both the body and the tail  She had surgery in July 2021 to 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  She had been prediabetic for many years but developed diabetes post the surgery in October 2021  She is utilizing octreotide for this cancer  She is on oral agents at home and takes metformin 500 mg twice daily  She denies any polyuria, polyphasia, and blurry vision  She has occasional thirst and once a night nocturia  He denies numbness or tingling of the feet  She denies chest pain or shortness of breath  She denies nephropathy, retinopathy, heart attack, stroke and claudication but does admit to neuropathy  Getting MRI every 6 months  Last one, the pictures were suboptimal        Hypoglycemic episodes: No never  The patient's last eye exam was in May 2022 with no retinopathy  The patient's last foot exam was in January 2023 at endocrine office visit  Last A1C was   Lab Results   Component Value Date    HGBA1C 5 8 (H) 06/06/2023     Blood Sugar/Glucometer/Pump/CGM review: Does not check blood sugars     She has a history of a nontoxic multinodular goiter with a right sided dominant thyroid nodule  This was noted on thyroid ultrasound in 2008 in the workup of a neuroendocrine tumor of the pancreas   40% of her pancreas had been removed and a PET scan was done noting uptake in the thyroid   This occurred in November 2021 and a biopsy was performed demonstrating a [place] class 3 lesion with Afirma testing that was benign on the T rad 4 3 1 cm right-sided thyroid nodule   Of note, there is a TI-RADS 5 nodule measuring less than 1 cm in the thyroid also   Last thyroid ultrasound was in Oct 2021 during this time of the biopsy   Dr Apoorva Michel at [facility] has ultrasound of the thyroid scheduled in December 2022       She is a history of mild hypercalcemia for which the thyroid ultrasound did demonstrate a possible lesion inferior to the left lobe of the thyroid that could be a parathyroid adenoma   Her calcium did normalize with a normal parathyroid hormone level   These levels are being followed over time      Review of Systems   Constitutional: Positive for fatigue  Negative for unexpected weight change  Weight 11 pounds more than last visit  fatigue some days  Eyes: Negative for visual disturbance  Respiratory: Negative for chest tightness and shortness of breath  Some Sob going up the stairs as the knes hurt       Cardiovascular: Negative for chest pain  Gastrointestinal: Negative for abdominal pain, constipation, diarrhea and nausea  Has loose stools, started metamucil to help  Endocrine: Positive for polydipsia  Negative for polyphagia and polyuria  Nocturia once a night  Occasional thirst     Musculoskeletal: Positive for arthralgias  Skin: Negative for wound  Neurological: Negative for dizziness, weakness, light-headedness, numbness and headaches  Slight numbness on the left foot  Psychiatric/Behavioral: Positive for sleep disturbance  Sleeping varies, some difficulty falling asleep and some awakening and unable to get back to sleep          Historical Information   Past Medical History:   Diagnosis Date   • H  pylori infection    • Neuroendocrine carcinoma ([facility] [address] ) 2008    pancreas with 40% bodya dn tail removed   • Rectocele      Past Surgical History:   Procedure Laterality Date   • CHOLECYSTECTOMY  07/2021    with liver surgery   • DISTAL PANCREATECTOMY  2008    body and tail removed, 40%   • LIVER SURGERY  07/2021    13 liver mets rremoved from pancreatic cancer   • TUBAL LIGATION Bilateral    • UMBILICAL HERNIA REPAIR  07/2021    with liver surgery     Social History   Social History     Substance and Sexual Activity   Alcohol Use Yes    Comment: rare glass of wine     Social History     Substance and Sexual Activity   Drug Use Never     Social History     Tobacco Use "  Smoking Status Never   Smokeless Tobacco Never     Family History:   Family History   Problem Relation Age of Onset   • Atrial fibrillation Mother    • Retinal detachment Mother    • Alcohol abuse Father    • Kidney nephrosis Father    • No Known Problems Sister    • Atrial fibrillation Brother    • Cervical cancer Maternal Grandmother    • Cervical cancer Paternal Grandmother    • Obesity Son    • Diabetes type II Son    • No Known Problems Son    • No Known Problems Daughter    • Retinal detachment Sister        Meds/Allergies   Current Outpatient Medications   Medication Sig Dispense Refill   • Ascorbic Acid (vitamin C) 1000 MG tablet Take 1,000 mg by mouth daily     • Blood Glucose Monitoring Suppl (OneTouch Verio) w/Device KIT Use to test blood sugars once daily 1 kit 0   • Glucosamine-Chondroitin (GLUCOSAMINE CHONDR COMPLEX PO) Take by mouth     • glucose blood test strip Use  To test blood sugars once daily 100 strip 3   • metFORMIN (GLUCOPHAGE) 500 mg tablet Take 1 tablet (500 mg total) by mouth 2 (two) times a day with meals 180 tablet 3   • Multiple Vitamins-Minerals (CENTRUM SILVER 50+WOMEN PO) Take 1 tablet by mouth daily     • octreotide (SandoSTATIN LAR DEPOT) 30 mg IM injection kit Inject 30 mg into a muscle every 28 days     • OneTouch Delica Lancets 93G MISC Use to test blood sugars once daily 100 each 3   • Probiotic Product (PROBIOTIC-10 PO) Take 1 tablet by mouth daily     • psyllium (METAMUCIL) 0 52 g capsule Take 0 52 g by mouth daily     • Turmeric 500 MG CAPS Take by mouth       No current facility-administered medications for this visit  Allergies   Allergen Reactions   • Levaquin [Levofloxacin] Other (See Comments)     Tendons       Objective   Vitals: Blood pressure 120/80, pulse 73, height 5' 1\" (1 549 m), weight 74 8 kg (165 lb)  Invasive Devices     None                 Physical Exam  Vitals reviewed  Constitutional:       Appearance: Normal appearance  She is well-developed   " She is obese  HENT:      Head: Normocephalic and atraumatic  Eyes:      Conjunctiva/sclera: Conjunctivae normal    Neck:      Thyroid: No thyromegaly  Vascular: No carotid bruit  Comments: Thyroid irregular and feel  No bruits over the thyroid gland  Cardiovascular:      Rate and Rhythm: Normal rate and regular rhythm  Heart sounds: Normal heart sounds  No murmur heard  Pulmonary:      Effort: Pulmonary effort is normal       Breath sounds: Normal breath sounds  No wheezing  Abdominal:      Palpations: Abdomen is soft  Musculoskeletal:         General: No deformity  Cervical back: Normal range of motion and neck supple  Right lower leg: No edema  Left lower leg: No edema  Comments: No tremor of the outstretched hands  Lymphadenopathy:      Cervical: No cervical adenopathy  Skin:     General: Skin is warm and dry  Findings: No rash  Neurological:      Mental Status: She is alert and oriented to person, place, and time  Deep Tendon Reflexes: Reflexes are normal and symmetric  Comments: Patellar deep tendon reflexes normal          The history was obtained from the review of the chart and from the patient  Lab Results:    Most recent Alc is  Lab Results   Component Value Date    HGBA1C 5 8 (H) 06/06/2023           Blood work performed on 6/6/2023 showed a CMP with a glucose of 113 fasting but was otherwise normal     Urine microalbumin to creatinine ratio is less than 8  Lab Results   Component Value Date    BUN 12 06/06/2023     06/06/2023    CO2 24 06/06/2023    CREATININE 0 81 06/06/2023    CREATININE 0 85 01/20/2023    CREATININE 0 85 07/22/2022    K 4 2 06/06/2023     eGFR   Date Value Ref Range Status   06/06/2023 79 >59 mL/min/1 73 Final       Total cholesterol 173, LDL cholesterol 88    Lab Results   Component Value Date    HDL 67 06/06/2023    TRIG 97 06/06/2023       Lab Results   Component Value Date    ALT 20 06/06/2023    AST 27 06/06/2023       Lab Results   Component Value Date    FREET4 1 02 06/06/2023    TSH 2 400 06/06/2023       Future Appointments   Date Time Provider Arslan Baez   12/15/2023 11:40 AM Cinthia Acevedo MD [facility]

## 2023-10-23 LAB
LEFT EYE DIABETIC RETINOPATHY: NORMAL
RIGHT EYE DIABETIC RETINOPATHY: NORMAL

## 2023-12-12 LAB
ALBUMIN SERPL-MCNC: 4.3 G/DL (ref 3.9–4.9)
ALBUMIN/GLOB SERPL: 1.8 {RATIO} (ref 1.2–2.2)
ALP SERPL-CCNC: 86 IU/L (ref 44–121)
ALT SERPL-CCNC: 17 IU/L (ref 0–32)
AST SERPL-CCNC: 21 IU/L (ref 0–40)
BILIRUB SERPL-MCNC: 0.5 MG/DL (ref 0–1.2)
BUN SERPL-MCNC: 13 MG/DL (ref 8–27)
BUN/CREAT SERPL: 16 (ref 12–28)
CALCIUM SERPL-MCNC: 10.4 MG/DL (ref 8.7–10.3)
CHLORIDE SERPL-SCNC: 101 MMOL/L (ref 96–106)
CO2 SERPL-SCNC: 24 MMOL/L (ref 20–29)
CREAT SERPL-MCNC: 0.82 MG/DL (ref 0.57–1)
EGFR: 77 ML/MIN/1.73
EST. AVERAGE GLUCOSE BLD GHB EST-MCNC: 123 MG/DL
GLOBULIN SER-MCNC: 2.4 G/DL (ref 1.5–4.5)
GLUCOSE SERPL-MCNC: 129 MG/DL (ref 70–99)
HBA1C MFR BLD: 5.9 % (ref 4.8–5.6)
POTASSIUM SERPL-SCNC: 4.4 MMOL/L (ref 3.5–5.2)
PROT SERPL-MCNC: 6.7 G/DL (ref 6–8.5)
SODIUM SERPL-SCNC: 137 MMOL/L (ref 134–144)

## 2023-12-15 ENCOUNTER — OFFICE VISIT (OUTPATIENT)
Dept: ENDOCRINOLOGY | Facility: HOSPITAL | Age: 68
End: 2023-12-15
Payer: MEDICARE

## 2023-12-15 VITALS
WEIGHT: 173.4 LBS | BODY MASS INDEX: 32.74 KG/M2 | HEART RATE: 84 BPM | DIASTOLIC BLOOD PRESSURE: 82 MMHG | SYSTOLIC BLOOD PRESSURE: 122 MMHG | HEIGHT: 61 IN

## 2023-12-15 DIAGNOSIS — E11.9 TYPE 2 DIABETES MELLITUS WITHOUT COMPLICATION, WITHOUT LONG-TERM CURRENT USE OF INSULIN (HCC): Primary | ICD-10-CM

## 2023-12-15 DIAGNOSIS — E11.42 DIABETIC POLYNEUROPATHY ASSOCIATED WITH TYPE 2 DIABETES MELLITUS (HCC): ICD-10-CM

## 2023-12-15 DIAGNOSIS — E83.52 HYPERCALCEMIA: ICD-10-CM

## 2023-12-15 DIAGNOSIS — E04.1 RIGHT THYROID NODULE: ICD-10-CM

## 2023-12-15 DIAGNOSIS — E04.2 GOITER, NONTOXIC, MULTINODULAR: ICD-10-CM

## 2023-12-15 PROCEDURE — 99214 OFFICE O/P EST MOD 30 MIN: CPT | Performed by: INTERNAL MEDICINE

## 2023-12-15 NOTE — PATIENT INSTRUCTIONS
Hgba1c is 5.9%. this is excellent.     Continue the same metformin 500 mg twice a day.     Work on better eating.     Make sure to drink plenty of water as the calcium is slightly higher. We'll follow this over time.     Make sure that angel alvarado sends me reports for my records.     Follow up in 6 months with blood work.

## 2023-12-15 NOTE — PROGRESS NOTES
gl12/18/2023    Assessment/Plan     1. Type 2 diabetes mellitus without complication, without long-term current use of insulin ([facility])  -     Comprehensive metabolic panel Lab Collect; Future; Expected date: 05/06/2024  -     HEMOGLOBIN A1C W/ EAG ESTIMATION Lab Collect; Future; Expected date: 05/06/2024  -     PTH, intact Lab Collect Lab Collect; Future; Expected date: 05/06/2024  -     TSH, 3rd generation Lab Collect; Future; Expected date: 05/06/2024  -     CBC and differential Lab Collect; Future; Expected date: 05/06/2024  -     Lipid Panel with Direct LDL reflex Lab Collect; Future; Expected date: 05/06/2024  -     Albumin / creatinine urine ratio; Future; Expected date: 05/06/2024  -     Phosphorus Lab Collect; Future; Expected date: 05/06/2024  -     T4, free Lab Collect; Future; Expected date: 05/06/2024  -     metFORMIN (GLUCOPHAGE) 500 mg tablet; Take 1 tablet (500 mg total) by mouth 2 (two) times a day with meals    2. Diabetic polyneuropathy associated with type 2 diabetes mellitus (HCC)  -     Comprehensive metabolic panel Lab Collect; Future; Expected date: 05/06/2024  -     HEMOGLOBIN A1C W/ EAG ESTIMATION Lab Collect; Future; Expected date: 05/06/2024  -     PTH, intact Lab Collect Lab Collect; Future; Expected date: 05/06/2024  -     TSH, 3rd generation Lab Collect; Future; Expected date: 05/06/2024  -     CBC and differential Lab Collect; Future; Expected date: 05/06/2024  -     Lipid Panel with Direct LDL reflex Lab Collect; Future; Expected date: 05/06/2024  -     Albumin / creatinine urine ratio; Future; Expected date: 05/06/2024  -     Phosphorus Lab Collect; Future; Expected date: 05/06/2024  -     T4, free Lab Collect; Future; Expected date: 05/06/2024    3. Goiter, nontoxic, multinodular  -     Comprehensive metabolic panel Lab Collect; Future; Expected date: 05/06/2024  -     HEMOGLOBIN A1C W/ EAG ESTIMATION Lab Collect; Future; Expected date: 05/06/2024  -     PTH, intact Lab Collect Lab  Collect; Future; Expected date: 05/06/2024  -     TSH, 3rd generation Lab Collect; Future; Expected date: 05/06/2024  -     CBC and differential Lab Collect; Future; Expected date: 05/06/2024  -     Lipid Panel with Direct LDL reflex Lab Collect; Future; Expected date: 05/06/2024  -     Albumin / creatinine urine ratio; Future; Expected date: 05/06/2024  -     Phosphorus Lab Collect; Future; Expected date: 05/06/2024  -     T4, free Lab Collect; Future; Expected date: 05/06/2024    4. Right thyroid nodule  -     Comprehensive metabolic panel Lab Collect; Future; Expected date: 05/06/2024  -     HEMOGLOBIN A1C W/ EAG ESTIMATION Lab Collect; Future; Expected date: 05/06/2024  -     PTH, intact Lab Collect Lab Collect; Future; Expected date: 05/06/2024  -     TSH, 3rd generation Lab Collect; Future; Expected date: 05/06/2024  -     CBC and differential Lab Collect; Future; Expected date: 05/06/2024  -     Lipid Panel with Direct LDL reflex Lab Collect; Future; Expected date: 05/06/2024  -     Albumin / creatinine urine ratio; Future; Expected date: 05/06/2024  -     Phosphorus Lab Collect; Future; Expected date: 05/06/2024  -     T4, free Lab Collect; Future; Expected date: 05/06/2024    5. Hypercalcemia  -     Comprehensive metabolic panel Lab Collect; Future; Expected date: 05/06/2024  -     HEMOGLOBIN A1C W/ EAG ESTIMATION Lab Collect; Future; Expected date: 05/06/2024  -     PTH, intact Lab Collect Lab Collect; Future; Expected date: 05/06/2024  -     TSH, 3rd generation Lab Collect; Future; Expected date: 05/06/2024  -     CBC and differential Lab Collect; Future; Expected date: 05/06/2024  -     Lipid Panel with Direct LDL reflex Lab Collect; Future; Expected date: 05/06/2024  -     Albumin / creatinine urine ratio; Future; Expected date: 05/06/2024  -     Phosphorus Lab Collect; Future; Expected date: 05/06/2024  -     T4, free Lab Collect; Future; Expected date: 05/06/2024         1. Type 2 diabetes.  Most  recent hemoglobin A1c of 5.9 percent does demonstrate excellent control of her diabetes. For now, she will continue the same metformin 500 mg twice a day. She has been indulging more in foods that are not appropriate for a diabetic, so she is going to work on changing her diet back and getting some regular exercise to try to lose some weight.    2. Diabetic neuropathy.  She has no neuropathic symptoms. Diabetic foot exam was performed in the office today.    3. Nontoxic multinodular goiter with right dominant thyroid nodule.  [facility] She is supposed to biopsy of that right nodule and to my palpation it has not changed in size. I will repeat her thyroid levels next visit.    4. Hypercalcemia.  She has had minor elevation in calcium again this visit. Previous parathyroid hormone levels have been normal. I will repeat her parathyroid hormone level and phosphorus with her calcium next visit.    I have asked her to follow up in 6 months with preceding hemoglobin A1c, CMP, phosphorus, intact parathyroid hormone level, CBC, lipid panel, TSH, free T4, and urine microalbumin to creatinine ratio.      CC: Diabetes type II, thyroid, calcium follow-up    History of Present Illness     HPI: Valerie Macedo is a 68 y.o. year old female with type 2 diabetes with neuropathy for 1-year, nontoxic multinodular goiter, hypercalcemia for follow-up visit. She was diagnosed with a neuroendocrine tumor of the pancreas in 2008 and 40 percent of her pancreas was removed from both the body and the tail. 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 She had been prediabetic for many years but developed diabetes post-surgery in 10/2021. Of note, she is utilizing octreotide to treat her neuroendocrine tumor.     Diabetic complications include neuropathy. She denies nephropathy,  retinopathy, myocardial infarction, stroke, or claudication.      Regarding her thyroid, she has a history of a nontoxic multinodular goiter with a right-sided dominant thyroid nodule. This was noted on thyroid ultrasound in 2008 and the workup of her neuroendocrine tumor of the pancreas. PET scan showed some uptake in her thyroid after her pancreatic surgery in 11/2021. A biopsy was performed demonstrating Liberty class 3 lesion with benign Afirma testing of a TI-RADS 4, 3.1 cm right-sided thyroid nodule. There is also a TI-RADS 5 thyroid nodule measuring less than 1 cm in the thyroid. Last thyroid ultrasound was in 12/2022 demonstrating unchanged thyroid nodules with the left upper nodule 1.1 cm suspected to be a prominent parathyroid gland.     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 Her calcium level was elevated but normalized on its own. The parathyroid hormone levels have been normal. She is accompanied by an adult male.     The patient confirms that she is on metformin 500 mg twice a day for her diabetes. She admits that she wakes up once at night to urinate. She denies polyuria, polyphagia, or polydipsia.     She reports that she has blurry vision, but she was not wearing eyeglasses. She went to the eye doctor in 10/2023 and they fixed her glasses, but she still needs them. She has never used progressive glasses for computers. She shares that she likes playing games on her iPad to keep her memory.     She does not see a podiatrist. She denies any paresthesia and wounds on the feet. Patient's last foot exam was in 01/2023.     She has not been checking her blood glucose level and denies any symptoms of hypoglycemia. She states that she was worried about her bleeding, since she has diabetes. Her average glucose test was within the normal range.      Patient states that her tumors are stable. She confirms that she  is still on octreotide once a month. She denies any abdominal pain, nausea, or constipation. She reports frequent diarrhea. She denies dysphagia but she mentions that she feels pressure when she takes her medications. She will repeat her thyroid ultrasound in early 01/2024 and then will follow up with the endocrinologist at [facility]. She denies palpitations, tremors, chest pain, or dyspnea. She reports that she has insomnia, fatigue, unable to take naps, cold sensitivity, mild dry skin, brittle nails, and very thin hair.     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      Last A1C was   Lab Results   Component Value Date    HGBA1C 5.9 (H) 12/11/2023       Review of Systems  The pertinent positive and negative findings are as noted in the HPI.    Historical Information   Past Medical History:   Diagnosis Date    H. pylori infection     Neuroendocrine carcinoma (HCC) 2008    pancreas with 40% bodya dn tail removed    Rectocele      Past Surgical History:   Procedure Laterality Date    CHOLECYSTECTOMY  07/2021    with liver surgery    DISTAL PANCREATECTOMY  2008    body and tail removed, 40%    LIVER SURGERY  07/2021    13 liver mets rremoved from pancreatic cancer    TUBAL LIGATION Bilateral     UMBILICAL HERNIA REPAIR  07/2021    with liver surgery     Social History   Social History     Substance and Sexual Activity   Alcohol Use Yes    Comment: rare glass of wine     Social History     Substance and Sexual Activity   Drug Use Never     Social History     Tobacco Use   Smoking Status Never   Smokeless Tobacco Never     Family History:   Family History   Problem Relation Age of Onset    Atrial fibrillation Mother     Retinal detachment Mother     Alcohol abuse Father     Kidney nephrosis Father     No Known Problems Sister     Atrial fibrillation Brother     Cervical cancer Maternal Grandmother     Cervical cancer Paternal Grandmother     Obesity Son     Diabetes type II Son     No  "Known Problems Son     No Known Problems Daughter     Retinal detachment Sister        Meds/Allergies   Current Outpatient Medications   Medication Sig Dispense Refill    Ascorbic Acid (vitamin C) 1000 MG tablet Take 1,000 mg by mouth daily      Blood Glucose Monitoring Suppl (OneTouch Verio) w/Device KIT Use to test blood sugars once daily 1 kit 0    Glucosamine-Chondroitin (GLUCOSAMINE CHONDR COMPLEX PO) Take by mouth      glucose blood test strip Use  To test blood sugars once daily 100 strip 3    metFORMIN (GLUCOPHAGE) 500 mg tablet Take 1 tablet (500 mg total) by mouth 2 (two) times a day with meals 180 tablet 3    Multiple Vitamins-Minerals (CENTRUM SILVER 50+WOMEN PO) Take 1 tablet by mouth daily      octreotide (SandoSTATIN LAR DEPOT) 30 mg IM injection kit Inject 30 mg into a muscle every 28 days      OneTouch Delica Lancets 30G MISC Use to test blood sugars once daily 100 each 3    Probiotic Product (PROBIOTIC-10 PO) Take 1 tablet by mouth daily      psyllium (METAMUCIL) 0.52 g capsule Take 0.52 g by mouth daily      Turmeric 500 MG CAPS Take by mouth       No current facility-administered medications for this visit.     Allergies   Allergen Reactions    Levaquin [Levofloxacin] Other (See Comments)     Tendons       Objective   Vitals: Blood pressure 122/82, pulse 84, height 5' 1\" (1.549 m), weight 78.7 kg (173 lb 6.4 oz).  Invasive Devices       None                   Physical Exam  Cardiovascular:      Pulses: no weak pulses          Dorsalis pedis pulses are 2+ on the right side and 2+ on the left side.        Posterior tibial pulses are 2+ on the right side and 2+ on the left side.   Feet:      Right foot:      Skin integrity: No ulcer, skin breakdown, erythema, warmth, callus or dry skin.      Left foot:      Skin integrity: No ulcer, skin breakdown, erythema, warmth, callus or dry skin.         Physical exam normal with pertinent positives and negatives.   EENT: Thyroid normal in size with about a 3 " cm thyroid nodule palpable in the right lobe.   Respiratory: Lungs clear.   Cardiovascular: Heart regular. No murmurs.   Neurological: No tremor of the outstretched hands. Patellar deep tendon reflexes normal.  Diabetic foot exam: No lower extremity edema. Dorsalis pedis and posterior tibialis is 2+. No ulcerations in the feet. Vibration sensation slightly diminished to the first toe DIP joint.  microfilament sensation intact with diminished sensation to the left first metatarsophalangeal joint and the right fifth metatarsophalangeal joint.  Patient's shoes and socks removed.    Right Foot/Ankle   Right Foot Inspection  Skin Exam: skin normal and skin intact. No dry skin, no warmth, no callus, no erythema, no maceration, no abnormal color, no pre-ulcer, no ulcer and no callus.     Toe Exam: No swelling and  no right toe deformity    Sensory   Vibration: diminished  Monofilament testing: diminished    Vascular  Capillary refills: < 3 seconds  The right DP pulse is 2+. The right PT pulse is 2+.     Left Foot/Ankle  Left Foot Inspection  Skin Exam: skin normal and skin intact. No dry skin, no warmth, no erythema, no maceration, normal color, no pre-ulcer, no ulcer and no callus.     Toe Exam: No swelling and no left toe deformity.     Sensory   Vibration: diminished  Monofilament testing: diminished    Vascular  Capillary refills: < 3 seconds  The left DP pulse is 2+. The left PT pulse is 2+.     Assign Risk Category  No deformity present  Loss of protective sensation  No weak pulses  Risk: 1        The history was obtained from the review of the chart and from the patient.    Lab Results:    Most recent Alc is  Lab Results   Component Value Date    HGBA1C 5.9 (H) 12/11/2023               Lab Results   Component Value Date    CREATININE 0.82 12/11/2023    CREATININE 0.81 06/06/2023    CREATININE 0.85 01/20/2023    BUN 13 12/11/2023    K 4.4 12/11/2023     12/11/2023    CO2 24 12/11/2023     eGFR   Date Value Ref  Range Status   12/11/2023 77 >59 mL/min/1.73 Final         Lab Results   Component Value Date    HDL 67 06/06/2023    TRIG 97 06/06/2023       Lab Results   Component Value Date    ALT 17 12/11/2023    AST 21 12/11/2023       Lab Results   Component Value Date    TSH 2.400 06/06/2023    FREET4 1.02 06/06/2023         Blood work performed on 12/11/2023 showed a hemoglobin A1c of 5.9 percent.     CMP showed glucose of 129 fasting, calcium 10.4 with an albumin of 4.3 but was otherwise normal.    Future Appointments   Date Time Provider Department Center   6/18/2024 11:40 AM Bernadine Rivas MD [facility]       Transcribed for Bernadine Rivas MD, by Nita Murray on 12/18/23 at 12:24 PM. Powered by [employer].

## 2024-06-12 LAB
ALBUMIN SERPL-MCNC: 4.3 G/DL (ref 3.9–4.9)
ALBUMIN/CREAT UR: <11 MG/G CREAT (ref 0–29)
ALBUMIN/GLOB SERPL: 2 {RATIO}
ALP SERPL-CCNC: 85 IU/L (ref 44–121)
ALT SERPL-CCNC: 14 IU/L (ref 0–32)
AST SERPL-CCNC: 25 IU/L (ref 0–40)
BASOPHILS # BLD AUTO: 0 X10E3/UL (ref 0–0.2)
BASOPHILS NFR BLD AUTO: 1 %
BILIRUB SERPL-MCNC: 0.4 MG/DL (ref 0–1.2)
BUN SERPL-MCNC: 12 MG/DL (ref 8–27)
BUN/CREAT SERPL: 16 (ref 12–28)
CALCIUM SERPL-MCNC: 9.6 MG/DL (ref 8.7–10.3)
CHLORIDE SERPL-SCNC: 100 MMOL/L (ref 96–106)
CHOLEST SERPL-MCNC: 171 MG/DL (ref 100–199)
CO2 SERPL-SCNC: 23 MMOL/L (ref 20–29)
CREAT SERPL-MCNC: 0.76 MG/DL (ref 0.57–1)
CREAT UR-MCNC: 28.1 MG/DL
EGFR: 84 ML/MIN/1.73
EOSINOPHIL # BLD AUTO: 0.2 X10E3/UL (ref 0–0.4)
EOSINOPHIL NFR BLD AUTO: 6 %
ERYTHROCYTE [DISTWIDTH] IN BLOOD BY AUTOMATED COUNT: 13.2 % (ref 11.7–15.4)
EST. AVERAGE GLUCOSE BLD GHB EST-MCNC: 131 MG/DL
GLOBULIN SER-MCNC: 2.2 G/DL (ref 1.5–4.5)
GLUCOSE SERPL-MCNC: 126 MG/DL (ref 70–99)
HBA1C MFR BLD: 6.2 % (ref 4.8–5.6)
HCT VFR BLD AUTO: 39.9 % (ref 34–46.6)
HDLC SERPL-MCNC: 59 MG/DL
HGB BLD-MCNC: 13.2 G/DL (ref 11.1–15.9)
IMM GRANULOCYTES # BLD: 0 X10E3/UL (ref 0–0.1)
IMM GRANULOCYTES NFR BLD: 0 %
LDLC SERPL CALC-MCNC: 90 MG/DL (ref 0–99)
LDLC/HDLC SERPL: 1.5 RATIO (ref 0–3.2)
LYMPHOCYTES # BLD AUTO: 0.9 X10E3/UL (ref 0.7–3.1)
LYMPHOCYTES NFR BLD AUTO: 23 %
MCH RBC QN AUTO: 28.9 PG (ref 26.6–33)
MCHC RBC AUTO-ENTMCNC: 33.1 G/DL (ref 31.5–35.7)
MCV RBC AUTO: 87 FL (ref 79–97)
MICROALBUMIN UR-MCNC: <3 UG/ML
MONOCYTES # BLD AUTO: 0.2 X10E3/UL (ref 0.1–0.9)
MONOCYTES NFR BLD AUTO: 5 %
NEUTROPHILS # BLD AUTO: 2.5 X10E3/UL (ref 1.4–7)
NEUTROPHILS NFR BLD AUTO: 65 %
PHOSPHATE SERPL-MCNC: 2.7 MG/DL (ref 3–4.3)
PLATELET # BLD AUTO: 184 X10E3/UL (ref 150–450)
POTASSIUM SERPL-SCNC: 4.2 MMOL/L (ref 3.5–5.2)
PROT SERPL-MCNC: 6.5 G/DL (ref 6–8.5)
PTH-INTACT SERPL-MCNC: 44 PG/ML (ref 15–65)
RBC # BLD AUTO: 4.57 X10E6/UL (ref 3.77–5.28)
SL AMB VLDL CHOLESTEROL CALC: 22 MG/DL (ref 5–40)
SODIUM SERPL-SCNC: 137 MMOL/L (ref 134–144)
T4 FREE SERPL-MCNC: 0.92 NG/DL (ref 0.82–1.77)
TRIGL SERPL-MCNC: 123 MG/DL (ref 0–149)
TSH SERPL DL<=0.005 MIU/L-ACNC: 2.82 UIU/ML (ref 0.45–4.5)
WBC # BLD AUTO: 3.8 X10E3/UL (ref 3.4–10.8)

## 2024-06-18 ENCOUNTER — OFFICE VISIT (OUTPATIENT)
Dept: ENDOCRINOLOGY | Facility: HOSPITAL | Age: 69
End: 2024-06-18
Payer: MEDICARE

## 2024-06-18 VITALS
HEART RATE: 81 BPM | BODY MASS INDEX: 32.59 KG/M2 | DIASTOLIC BLOOD PRESSURE: 80 MMHG | HEIGHT: 61 IN | SYSTOLIC BLOOD PRESSURE: 120 MMHG | WEIGHT: 172.6 LBS

## 2024-06-18 DIAGNOSIS — E83.52 HYPERCALCEMIA: ICD-10-CM

## 2024-06-18 DIAGNOSIS — E04.2 GOITER, NONTOXIC, MULTINODULAR: ICD-10-CM

## 2024-06-18 DIAGNOSIS — E11.9 TYPE 2 DIABETES MELLITUS WITHOUT COMPLICATION, WITHOUT LONG-TERM CURRENT USE OF INSULIN (HCC): Primary | ICD-10-CM

## 2024-06-18 DIAGNOSIS — E11.42 DIABETIC POLYNEUROPATHY ASSOCIATED WITH TYPE 2 DIABETES MELLITUS (HCC): ICD-10-CM

## 2024-06-18 PROCEDURE — 99215 OFFICE O/P EST HI 40 MIN: CPT | Performed by: INTERNAL MEDICINE

## 2024-06-18 NOTE — PROGRESS NOTES
6/18/2024    Assessment & Plan      Diagnoses and all orders for this visit:    Type 2 diabetes mellitus without complication, without long-term current use of insulin (HCC)  -     Comprehensive metabolic panel; Future  -     Hemoglobin A1C; Future    Diabetic polyneuropathy associated with type 2 diabetes mellitus (HCC)  -     Comprehensive metabolic panel; Future  -     Hemoglobin A1C; Future    Goiter, nontoxic, multinodular  -     Comprehensive metabolic panel; Future  -     Hemoglobin A1C; Future    Hypercalcemia  -     Comprehensive metabolic panel; Future  -     Hemoglobin A1C; Future          Assessment & Plan  1. Type 2 diabetes.  The patient's most recent hemoglobin A1c level is 6.2 percent, indicating excellent diabetes control. The patient is advised to persist with the current regimen of metformin 500 mg twice daily. She is encouraged to persist with dietary modifications and regular exercise.    2. Diabetic neuropathy.  The patient denies experiencing any neuropathic symptoms. Her diabetic foot exams are current.    3. Nontoxic multinodular goiter.  The patient has been under the care of endocrinology for this condition. The most recent thyroid ultrasound revealed an enlargement of her thyroid nodule up to 4.2 cm on the right with a second TI-RADS 1.4 cm thyroid nodule in the right. A fine needle aspiration biopsy of both nodules at [facility], which currently reads [place] class III, is pending. A discussion was held regarding the necessity of biopsy and the possibility of thyroid removal due to frequent biopsies, continued enlargement of the thyroid nodule, and compressive-type symptoms. She will discuss this with her [facility] doctor.    4.  History of hypercalcemia.  The patient has experienced intermittent mildly elevated calcium levels. An ultrasound conducted in 01/2024 at [facility] revealed a parathyroid adenoma 1.3 cm superior to the left thyroid lobe, which correlates with laboratory  assessment. Her calcium and parathyroid hormone levels were normal, with a mildly low phosphorus level. A sestamibi scan was performed, which did not reveal any uptake in the left, but did reveal uptake in the right thyroid nodule. A 24-hour urine calcium test was normal. A DEXA scan was also performed, which was normal. At present, no further parathyroid work-up is required and there is no need for parathyroid surgery. The patient has been advised to keep me informed of the results of the thyroid nodule and discuss the discussion with her [place] doctor.    Follow-up  The patient is scheduled for a follow-up visit in 6 months, with pre-visit hemoglobin A1c and CMP tests to be conducted prior to the visit.    I have spent a total time of 45 minutes on 06/18/24 in caring for this patient including Diagnostic results, Prognosis, Risks and benefits of tx options, Instructions for management, Patient and family education, Importance of tx compliance, Risk factor reductions, Impressions, Counseling / Coordination of care, Documenting in the medical record, Reviewing / ordering tests, medicine, procedures  , and Obtaining or reviewing history  .      CC: Diabetes type II, thyroid nodule/goiter, parathyroid/hypercalcemia follow-up    History of Present Illness    HPI: Valerie Macedo is a 68-year-old female with a history of type 2 diabetes with neuropathy diagnosed 2 years ago, nontoxic multinodular goiter, hypercalcemia, here for follow-up visit.     Diabetes complications include neuropathy. She denies nephropathy, retinopathy, heart attack, stroke, or claudication.    The patient is currently on a regimen of metformin 500 mg twice daily for diabetes management. She reports nocturia, typically once or twice per night, which she attributes to increased fluid intake before bedtime. She denies experiencing polydipsia or polyphagia.     Her last ophthalmological examination, conducted by Dr. Shane in 10/2023,  revealed no diabetic retinopathy. She has been experiencing increased swelling and stiffness in her feet for the past 1.5 to 2 weeks, with the left foot being more affected than the right. She denies any sores or wounds on her feet. She has not consulted with a podiatrist.  Last diabetic foot exam was in December 2023.  Her physical activity has decreased due to hot weather and lack of sunscreen application.     The patient has a history of multiple thyroid nodules, which were biopsied on 05/15/2023 at [place]. She reports occasional coughing, which she attributes to water consumption. However, she experiences a sensation of something lodged in her throat when taking long capsules. She describes a sensation of pressure in her esophagus. She tends to be on the colder side. She denies any tremors, tremors, palpitations, or constipation. She experiences diarrhea, which she attributes to her gallbladder removal. She takes Sandostatin and has started Metamucil capsules, which provide intermittent relief. Her dry skin has always been on the dry side. Her nails do not break frequently, but are rigid and wavy. She reports hair thinning, which has stabilized. Her sleep is poor, with difficulty falling asleep and frequent awakenings. She reports daytime fatigue. Her weight has remained stable, but she expresses a desire to reduce it. She has undergone an ultrasound for 3 years consecutively. The first biopsy was inconclusive, but the second biopsy was inconclusive. The second biopsy revealed a large nodule, which was biopsied.    Blood Sugar/Glucometer/Pump/CGM review: She has not been monitoring her blood glucose levels recently and denies any episodes of hypoglycemia.    Historical Information   Past Medical History:   Diagnosis Date    H. pylori infection     Neuroendocrine carcinoma (HCC) 2008    pancreas with 40% bodya dn tail removed    Rectocele      Past Surgical History:   Procedure Laterality Date     CHOLECYSTECTOMY  07/2021    with liver surgery    DISTAL PANCREATECTOMY  2008    body and tail removed, 40%    LIVER SURGERY  07/2021    13 liver mets rremoved from pancreatic cancer    TUBAL LIGATION Bilateral     UMBILICAL HERNIA REPAIR  07/2021    with liver surgery     Social History   Social History     Substance and Sexual Activity   Alcohol Use Yes    Comment: rare glass of wine     Social History     Substance and Sexual Activity   Drug Use Never     Social History     Tobacco Use   Smoking Status Never   Smokeless Tobacco Never     Family History:   Family History   Problem Relation Age of Onset    Atrial fibrillation Mother     Retinal detachment Mother     Alcohol abuse Father     Kidney nephrosis Father     No Known Problems Sister     Atrial fibrillation Brother     Cervical cancer Maternal Grandmother     Cervical cancer Paternal Grandmother     Obesity Son     Diabetes type II Son     No Known Problems Son     No Known Problems Daughter     Retinal detachment Sister        Meds/Allergies   Current Outpatient Medications   Medication Sig Dispense Refill    Ascorbic Acid (vitamin C) 1000 MG tablet Take 1,000 mg by mouth daily      Blood Glucose Monitoring Suppl (OneTouch Verio) w/Device KIT Use to test blood sugars once daily 1 kit 0    Glucosamine-Chondroitin (GLUCOSAMINE CHONDR COMPLEX PO) Take by mouth      glucose blood test strip Use  To test blood sugars once daily 100 strip 3    metFORMIN (GLUCOPHAGE) 500 mg tablet Take 1 tablet (500 mg total) by mouth 2 (two) times a day with meals 180 tablet 3    Multiple Vitamins-Minerals (CENTRUM SILVER 50+WOMEN PO) Take 1 tablet by mouth daily      octreotide (SandoSTATIN LAR DEPOT) 30 mg IM injection kit Inject 30 mg into a muscle every 28 days      OneTouch Delica Lancets 30G MISC Use to test blood sugars once daily 100 each 3    Probiotic Product (PROBIOTIC-10 PO) Take 1 tablet by mouth daily      psyllium (METAMUCIL) 0.52 g capsule Take 0.52 g by mouth  "daily      Turmeric 500 MG CAPS Take by mouth       No current facility-administered medications for this visit.     Allergies   Allergen Reactions    Levaquin [Levofloxacin] Other (See Comments)     Tendons       Objective   Vitals: Blood pressure 120/80, pulse 81, height 5' 1\" (1.549 m), weight 78.3 kg (172 lb 9.6 oz).  Invasive Devices       None                   Physical Exam  Physical exam normal with pertinent positives and negatives.    Thyroid is irregular and feeling nodular with the right lobe larger than the left. No lymphadenopathy. No palpable masses or parathyroid.  Lungs are clear to auscultation.  Heart has a regular rate and rhythm. No murmurs.  No tremor of the outstretched hands.  Neurologic, patellar deep tendon reflexes are normal.      The history was obtained from the review of the chart and from the patient and .    Lab Results:    Most recent Alc is  Lab Results   Component Value Date    HGBA1C 6.2 (H) 06/11/2024           Blood work performed on 6/11/2024 showed a CMP with a calcium of 9.6 in the setting of an albumin of 4.3.   fasting glucose is 126.  Phosphorus is 2.7.  Intact parathyroid hormone level is 44.    Urine microalbumin to creatinine ratio is less than 11.    CBC is normal.    Lab Results   Component Value Date    CREATININE 0.76 06/11/2024    CREATININE 0.82 12/11/2023    CREATININE 0.81 06/06/2023    BUN 12 06/11/2024    K 4.2 06/11/2024     06/11/2024    CO2 23 06/11/2024     eGFR   Date Value Ref Range Status   06/11/2024 84 >59 mL/min/1.73 Final     Total cholesterol 171, LDL cholesterol 90.    Lab Results   Component Value Date    HDL 59 06/11/2024    TRIG 123 06/11/2024       Lab Results   Component Value Date    ALT 14 06/11/2024    AST 25 06/11/2024    ALKPHOS 141 01/10/2022       Lab Results   Component Value Date    TSH 2.820 06/11/2024    FREET4 0.92 06/11/2024             Future Appointments   Date Time Provider Department Center   12/17/2024  1:40 PM " Bernadine Rivas MD [facility]

## 2024-06-18 NOTE — PATIENT INSTRUCTIONS
Hgba1c is 6.2%. this is excellent.     Continue the same metformin.     The thyroid nodule biopsy shows that the thyroid nodule biopsy is indeterminate. The genetic testing is not yet available.     The calcium and parathyroid levels are normal. You do not have a parathyroid adenoma and no need for parathyroid scan or surgery.     Follow up in 6 months with blood work.

## 2024-11-29 DIAGNOSIS — E11.9 TYPE 2 DIABETES MELLITUS WITHOUT COMPLICATION, WITHOUT LONG-TERM CURRENT USE OF INSULIN (HCC): ICD-10-CM

## 2024-12-12 ENCOUNTER — RESULTS FOLLOW-UP (OUTPATIENT)
Dept: ENDOCRINOLOGY | Facility: HOSPITAL | Age: 69
End: 2024-12-12

## 2024-12-12 LAB
ALBUMIN SERPL-MCNC: 4.2 G/DL (ref 3.8–4.8)
ALP SERPL-CCNC: 82 IU/L (ref 44–121)
ALT SERPL-CCNC: 15 IU/L (ref 0–32)
AST SERPL-CCNC: 19 IU/L (ref 0–40)
BILIRUB SERPL-MCNC: 0.5 MG/DL (ref 0–1.2)
BUN SERPL-MCNC: 15 MG/DL (ref 8–27)
BUN/CREAT SERPL: 17 (ref 12–28)
CALCIUM SERPL-MCNC: 10 MG/DL (ref 8.7–10.3)
CHLORIDE SERPL-SCNC: 104 MMOL/L (ref 96–106)
CO2 SERPL-SCNC: 21 MMOL/L (ref 20–29)
CREAT SERPL-MCNC: 0.9 MG/DL (ref 0.57–1)
EGFR: 68 ML/MIN/1.73
GLOBULIN SER-MCNC: 2.3 G/DL (ref 1.5–4.5)
GLUCOSE SERPL-MCNC: 126 MG/DL (ref 70–99)
HBA1C MFR BLD: 6.5 % (ref 4.8–5.6)
POTASSIUM SERPL-SCNC: 3.9 MMOL/L (ref 3.5–5.2)
PROT SERPL-MCNC: 6.5 G/DL (ref 6–8.5)
SODIUM SERPL-SCNC: 142 MMOL/L (ref 134–144)

## 2024-12-17 ENCOUNTER — OFFICE VISIT (OUTPATIENT)
Dept: ENDOCRINOLOGY | Facility: HOSPITAL | Age: 69
End: 2024-12-17
Payer: MEDICARE

## 2024-12-17 VITALS
HEIGHT: 61 IN | SYSTOLIC BLOOD PRESSURE: 122 MMHG | OXYGEN SATURATION: 100 % | HEART RATE: 75 BPM | DIASTOLIC BLOOD PRESSURE: 72 MMHG | WEIGHT: 173.8 LBS | BODY MASS INDEX: 32.81 KG/M2

## 2024-12-17 DIAGNOSIS — E04.2 GOITER, NONTOXIC, MULTINODULAR: ICD-10-CM

## 2024-12-17 DIAGNOSIS — E11.9 TYPE 2 DIABETES MELLITUS WITHOUT COMPLICATION, WITHOUT LONG-TERM CURRENT USE OF INSULIN (HCC): Primary | ICD-10-CM

## 2024-12-17 DIAGNOSIS — E83.52 HYPERCALCEMIA: ICD-10-CM

## 2024-12-17 DIAGNOSIS — E11.42 DIABETIC POLYNEUROPATHY ASSOCIATED WITH TYPE 2 DIABETES MELLITUS (HCC): ICD-10-CM

## 2024-12-17 PROCEDURE — G2211 COMPLEX E/M VISIT ADD ON: HCPCS | Performed by: INTERNAL MEDICINE

## 2024-12-17 PROCEDURE — 99214 OFFICE O/P EST MOD 30 MIN: CPT | Performed by: INTERNAL MEDICINE

## 2024-12-17 RX ORDER — HYDROCORTISONE 25 MG/G
1 CREAM TOPICAL EVERY 12 HOURS
COMMUNITY
Start: 2024-10-24 | End: 2025-04-22

## 2024-12-17 NOTE — PROGRESS NOTES
12/17/2024    Assessment & Plan      Diagnoses and all orders for this visit:    Type 2 diabetes mellitus without complication, without long-term current use of insulin (HCC)  -     Comprehensive metabolic panel; Future  -     T4, free; Future  -     TSH, 3rd generation; Future  -     Hemoglobin A1C; Future  -     Albumin / creatinine urine ratio; Future  -     Lipid Panel with Direct LDL reflex; Future    Diabetic polyneuropathy associated with type 2 diabetes mellitus (HCC)  -     Comprehensive metabolic panel; Future  -     T4, free; Future  -     TSH, 3rd generation; Future  -     Hemoglobin A1C; Future  -     Albumin / creatinine urine ratio; Future  -     Lipid Panel with Direct LDL reflex; Future    Goiter, nontoxic, multinodular  -     Comprehensive metabolic panel; Future  -     T4, free; Future  -     TSH, 3rd generation; Future  -     Hemoglobin A1C; Future  -     Albumin / creatinine urine ratio; Future  -     Lipid Panel with Direct LDL reflex; Future  -     T4, free; Future  -     TSH, 3rd generation; Future    Hypercalcemia  -     Comprehensive metabolic panel; Future  -     T4, free; Future  -     TSH, 3rd generation; Future  -     Hemoglobin A1C; Future  -     Albumin / creatinine urine ratio; Future  -     Lipid Panel with Direct LDL reflex; Future    Other orders  -     hydrocortisone (ANUSOL-HC) 2.5 % rectal cream; 1 Application Every 12 hours          Assessment & Plan  1. Type 2 diabetes mellitus with neuropathy.  Her hemoglobin A1c levels have been gradually increasing, from 5.9 last year to 6.2 in the summer, and currently at 6.5. Despite this, her diabetes remains under excellent control. She reports no nephropathy, retinopathy, heart attack, stroke, or claudication. She reports slight worsening of numbness and tingling in her feet. A diabetic foot exam was performed, showing trace to 1+ bilateral lower extremity edema, left worse than right, and diminished vibration sensation in the first  toe DIP joints bilaterally. She is advised to maintain a balanced diet and engage in regular physical activity. She will continue her current regimen of metformin 500 mg twice a day.    2. Nontoxic multinodular goiter.  She has 2 major nodules and 1 small nodule on the right side of her thyroid. Recent genetic testing indicated the presence of certain genes associated with thyroid cancer. She has a scheduled thyroid surgery on 01/14/2025 [place]. She has been informed that the procedure carries a 60 percent risk and has been advised against bilateral thyroidectomy. She has also been advised against a biopsy of the left side of her thyroid. She is curious about the necessity of thyroid medication post-surgery.  She is advised to proceed with the removal of the right side of the thyroid and await the pathology results. A repeat thyroid blood work will be ordered 2 to 3 months post-surgery to assess the need for thyroid medication. She is instructed to inform us of the surgical outcome. If the pathology results indicate benign nodules, the remaining half of the thyroid will not be removed. However, if follicular cancer is detected, a second surgery will be necessary to remove the other half of the thyroid.    3.  Left neck nodule.  Her calcium levels were reported to be within the normal range during her last visit in December. The potential parathyroid adenoma on the left side is not causing any issues at this time. No biopsy is needed for the parathyroid adenoma unless it becomes overactive.    4.  Diabetic neuropathy.    She denies significant neuropathic symptoms.  Diabetic foot exam was performed in the office today.    I have asked her to follow-up in 6 months with preceding hemoglobin A1c, CMP, TSH, free T4, lipid panel, and urine microalbumin to creatinine ratio.    A TSH and free T4 has been ordered for 8 weeks postsurgery.    CC: Diabetes type II, thyroid follow-up     History of Present Illness    HPI:  Valerie Macedo is a 69-year-old female with a history of type 2 diabetes with neuropathy diagnosed 2 years ago, nontoxic multinodular goiter, and hypercalcemia, here for a follow-up visit.     Diabetes complications include neuropathy. She reports no nephropathy, retinopathy, heart attack, stroke, or claudication.    She continues to manage her diabetes with metformin 500 mg twice daily.  She reports normal urinary frequency, with 1 to 2 episodes of urination at night. She does not experience excessive thirst or hunger but maintains a high water intake. Her weight has remained stable. She reports no abdominal pain or nausea.     She reports a slight exacerbation in numbness and tingling in her feet. She does not experience any visual disturbances, attributing her need for glasses to age-related changes.     She had an ophthalmology appointment in 06/2024. She reports no chest pain or shortness of breath.     She has been experiencing intermittent swelling in her feet, which has been more pronounced on the left side for the past few months. She also reports soreness in her feet.     She has 3 nodules on the right side of her thyroid, 2 major and 1 small, which have not been biopsied. She recently had a parathyroid hormone test, which returned normal results. A biopsy of the nodules was performed in 05/2024, but the results were inconclusive. She has a scheduled appointment on 01/14/2025 for a potential thyroidectomy. She has expressed a preference for complete thyroid removal due to the increasing size of the nodules. She has been informed that the procedure carries a 60 percent risk and has been advised against bilateral thyroidectomy. She has also been advised against a biopsy of the left side of her thyroid. She is curious about the necessity of thyroid medication post-surgery. She has been advised to follow up with her endocrinologist 6 weeks post-surgery.    Supplemental Information  She is still taking  octreotide 30 mg injected every month for the pancreatic cancer tumor, which has metastasized to the liver. She takes a bunch of vitamins.    Blood Sugar/Glucometer/Pump/CGM review: She does not monitor her blood glucose levels at home and has not experienced any hypoglycemic episodes.      Historical Information   Past Medical History:   Diagnosis Date    H. pylori infection     Neuroendocrine carcinoma (HCC) 2008    pancreas with 40% bodya dn tail removed    Rectocele      Past Surgical History:   Procedure Laterality Date    CHOLECYSTECTOMY  07/2021    with liver surgery    DISTAL PANCREATECTOMY  2008    body and tail removed, 40%    LIVER SURGERY  07/2021    13 liver mets rremoved from pancreatic cancer    TUBAL LIGATION Bilateral     UMBILICAL HERNIA REPAIR  07/2021    with liver surgery    US GUIDED THYROID BIOPSY Right     going for removal of right side 1/14/2025     Social History   Social History     Substance and Sexual Activity   Alcohol Use Yes    Comment: Occasionally     Social History     Substance and Sexual Activity   Drug Use Never     Social History     Tobacco Use   Smoking Status Never   Smokeless Tobacco Never     Family History:   Family History   Problem Relation Age of Onset    Atrial fibrillation Mother     Retinal detachment Mother     Alcohol abuse Father     Kidney nephrosis Father     No Known Problems Sister     Atrial fibrillation Brother     Cervical cancer Maternal Grandmother     Cervical cancer Paternal Grandmother     Obesity Son     Diabetes type II Son     No Known Problems Son     No Known Problems Daughter     Retinal detachment Sister        Meds/Allergies   Current Outpatient Medications   Medication Sig Dispense Refill    Ascorbic Acid (vitamin C) 1000 MG tablet Take 1,000 mg by mouth daily      Blood Glucose Monitoring Suppl (OneTouch Verio) w/Device KIT Use to test blood sugars once daily 1 kit 0    Glucosamine-Chondroitin (GLUCOSAMINE CHONDR COMPLEX PO) Take by mouth    "   glucose blood test strip Use  To test blood sugars once daily 100 strip 3    hydrocortisone (ANUSOL-HC) 2.5 % rectal cream 1 Application Every 12 hours      metFORMIN (GLUCOPHAGE) 500 mg tablet TAKE 1 TABLET BY MOUTH TWICE A DAY WITH MEALS 180 tablet 1    Multiple Vitamins-Minerals (CENTRUM SILVER 50+WOMEN PO) Take 1 tablet by mouth daily      octreotide (SandoSTATIN LAR DEPOT) 30 mg IM injection kit Inject 30 mg into a muscle every 28 days      OneTouch Delica Lancets 30G MISC Use to test blood sugars once daily 100 each 3    Probiotic Product (PROBIOTIC-10 PO) Take 1 tablet by mouth daily      psyllium (METAMUCIL) 0.52 g capsule Take 0.52 g by mouth daily      Turmeric 500 MG CAPS Take by mouth (Patient not taking: Reported on 12/17/2024)       No current facility-administered medications for this visit.     Allergies   Allergen Reactions    Levaquin [Levofloxacin] Other (See Comments)     Tendons       Objective   Vitals: Blood pressure 122/72, pulse 75, height 5' 1\" (1.549 m), weight 78.8 kg (173 lb 12.8 oz), SpO2 100%.  Invasive Devices       None                   Physical Exam    Thyroid remains enlarged on the right but not on the left and irregular and nodular in feel on the right. No lymphadenopathy.  Lungs are clear to auscultation.  Heart has a regular rate and rhythm. No murmurs.  No tremor of the outstretched hands. Patellar deep tendon reflexes are normal. Trace to 1+ bilateral lower extremity edema, left worse than right. No ulcerations in the feet. Dorsalis pedis and posterior tibialis pulses are 2+. Vibration sensation diminished to the first toe DIP joint bilaterally. Monofilament sensation intact to both feet though diminished a little at the fifth toes bilaterally.  Patient's shoes and socks removed.    Right Foot/Ankle   Right Foot Inspection  Skin Exam: skin normal and skin intact. No dry skin, no warmth, no callus, no erythema, no maceration, no abnormal color, no pre-ulcer, no ulcer and " no callus.     Toe Exam: No swelling and  no right toe deformity    Sensory   Vibration: diminished  Monofilament testing: diminished    Vascular  The right DP pulse is 1+. The right PT pulse is 1+.     Left Foot/Ankle  Left Foot Inspection  Skin Exam: skin normal and skin intact. No dry skin, no warmth, no erythema, no maceration, normal color, no pre-ulcer, no ulcer and no callus.     Toe Exam: swelling. No left toe deformity.     Sensory   Vibration: diminished  Monofilament testing: diminished    Vascular  The left DP pulse is 1+. The left PT pulse is 1+.     Assign Risk Category  No deformity present  Loss of protective sensation  Weak pulses  Risk: 2        The history was obtained from the review of the chart and from the patient.    Lab Results:    Most recent Alc is  Lab Results   Component Value Date    HGBA1C 6.5 (H) 12/11/2024           Blood work performed on 12/11/2024 showed a CMP with a glucose of 126 fasting.    Intact parathyroid hormone level is 36.    CBC is normal.    Lab Results   Component Value Date    CREATININE 0.90 12/11/2024    CREATININE 0.76 06/11/2024    CREATININE 0.82 12/11/2023    BUN 15 12/11/2024    K 3.9 12/11/2024     12/11/2024    CO2 21 12/11/2024     eGFR   Date Value Ref Range Status   12/11/2024 68 >59 mL/min/1.73 Final         Lab Results   Component Value Date    HDL 59 06/11/2024    TRIG 123 06/11/2024       Lab Results   Component Value Date    ALT 15 12/11/2024    AST 19 12/11/2024    ALKPHOS 141 01/10/2022       Lab Results   Component Value Date    TSH 2.820 06/11/2024    FREET4 0.92 06/11/2024             Future Appointments   Date Time Provider Department Center   6/18/2025  2:00 PM Bernadine Rivas MD [facility]

## 2024-12-17 NOTE — PATIENT INSTRUCTIONS
Hgba1c is 6.5%. this is excellent but higher than your usual.     Continue the same metformin dosage.     Work on diet ane regular exercise.     I agree with removing only the right side of the thyroid for now and waiting on the pathology. Would repeat the thyroid blood work 2 months after the surgery to determine if thyroid medicine needed.     Follow up in 6 months with blood work.

## 2025-01-22 ENCOUNTER — TELEPHONE (OUTPATIENT)
Age: 70
End: 2025-01-22

## 2025-01-22 NOTE — TELEPHONE ENCOUNTER
Patient  called she want Dr mi know that her Pathology report  is completed and  and she will get her labs done  a little later.

## 2025-01-27 NOTE — TELEPHONE ENCOUNTER
The report in Media is from 10/2024. There is a report from 1/14/25 in the Care Everywhere from [facility].

## 2025-01-27 NOTE — TELEPHONE ENCOUNTER
Patient calling back stating it was done at [facility]. There is a report from them scanned into media on 1/9/2025. Unsure if this is the correct one as patient stated she had it done around the 16th. Patient states she did ask [facility] to fax us a copy of the pathology results.     Patient states if we have any other questions to please let her know.

## 2025-01-31 DIAGNOSIS — E89.0 STATUS POST PARTIAL THYROIDECTOMY: Primary | ICD-10-CM

## 2025-01-31 DIAGNOSIS — E04.2 GOITER, NONTOXIC, MULTINODULAR: ICD-10-CM

## 2025-04-10 LAB
T4 FREE SERPL-MCNC: 1.02 NG/DL (ref 0.82–1.77)
TSH SERPL DL<=0.005 MIU/L-ACNC: 7.44 UIU/ML (ref 0.45–4.5)

## 2025-04-21 ENCOUNTER — RESULTS FOLLOW-UP (OUTPATIENT)
Dept: ENDOCRINOLOGY | Facility: HOSPITAL | Age: 70
End: 2025-04-21

## 2025-05-19 DIAGNOSIS — E11.9 TYPE 2 DIABETES MELLITUS WITHOUT COMPLICATION, WITHOUT LONG-TERM CURRENT USE OF INSULIN (HCC): ICD-10-CM

## 2025-06-13 LAB
ALBUMIN SERPL-MCNC: 4.1 G/DL (ref 3.8–4.8)
ALBUMIN/CREAT UR: <10 MG/G CREAT (ref 0–29)
ALP SERPL-CCNC: 78 IU/L (ref 44–121)
ALT SERPL-CCNC: 15 IU/L (ref 0–32)
AST SERPL-CCNC: 20 IU/L (ref 0–40)
BILIRUB SERPL-MCNC: 0.5 MG/DL (ref 0–1.2)
BUN SERPL-MCNC: 13 MG/DL (ref 8–27)
BUN/CREAT SERPL: 16 (ref 12–28)
CALCIUM SERPL-MCNC: 9.8 MG/DL (ref 8.7–10.3)
CHLORIDE SERPL-SCNC: 100 MMOL/L (ref 96–106)
CHOLEST SERPL-MCNC: 182 MG/DL (ref 100–199)
CO2 SERPL-SCNC: 23 MMOL/L (ref 20–29)
CREAT SERPL-MCNC: 0.81 MG/DL (ref 0.57–1)
CREAT UR-MCNC: 30.8 MG/DL
EGFR: 78 ML/MIN/1.73
GLOBULIN SER-MCNC: 2.2 G/DL (ref 1.5–4.5)
GLUCOSE SERPL-MCNC: 118 MG/DL (ref 70–99)
HBA1C MFR BLD: 6.1 % (ref 4.8–5.6)
HDLC SERPL-MCNC: 57 MG/DL
LDLC SERPL CALC-MCNC: 106 MG/DL (ref 0–99)
LDLC/HDLC SERPL: 1.9 RATIO (ref 0–3.2)
MICROALBUMIN UR-MCNC: <3 UG/ML
POTASSIUM SERPL-SCNC: 4 MMOL/L (ref 3.5–5.2)
PROT SERPL-MCNC: 6.3 G/DL (ref 6–8.5)
SL AMB VLDL CHOLESTEROL CALC: 19 MG/DL (ref 5–40)
SODIUM SERPL-SCNC: 138 MMOL/L (ref 134–144)
T4 FREE SERPL-MCNC: 0.98 NG/DL (ref 0.82–1.77)
TRIGL SERPL-MCNC: 107 MG/DL (ref 0–149)
TSH SERPL DL<=0.005 MIU/L-ACNC: 7.25 UIU/ML (ref 0.45–4.5)

## 2025-06-16 ENCOUNTER — RESULTS FOLLOW-UP (OUTPATIENT)
Dept: ENDOCRINOLOGY | Facility: HOSPITAL | Age: 70
End: 2025-06-16

## 2025-06-18 ENCOUNTER — OFFICE VISIT (OUTPATIENT)
Dept: ENDOCRINOLOGY | Facility: HOSPITAL | Age: 70
End: 2025-06-18
Payer: MEDICARE

## 2025-06-18 VITALS
SYSTOLIC BLOOD PRESSURE: 122 MMHG | HEIGHT: 61 IN | BODY MASS INDEX: 32.81 KG/M2 | DIASTOLIC BLOOD PRESSURE: 82 MMHG | HEART RATE: 79 BPM | OXYGEN SATURATION: 100 % | WEIGHT: 173.8 LBS

## 2025-06-18 DIAGNOSIS — E89.0 STATUS POST PARTIAL THYROIDECTOMY: ICD-10-CM

## 2025-06-18 DIAGNOSIS — E11.42 DIABETIC POLYNEUROPATHY ASSOCIATED WITH TYPE 2 DIABETES MELLITUS (HCC): ICD-10-CM

## 2025-06-18 DIAGNOSIS — E04.2 GOITER, NONTOXIC, MULTINODULAR: ICD-10-CM

## 2025-06-18 DIAGNOSIS — E11.9 TYPE 2 DIABETES MELLITUS WITHOUT COMPLICATION, WITHOUT LONG-TERM CURRENT USE OF INSULIN (HCC): Primary | ICD-10-CM

## 2025-06-18 DIAGNOSIS — E89.0 POSTOPERATIVE HYPOTHYROIDISM: ICD-10-CM

## 2025-06-18 DIAGNOSIS — E83.52 HYPERCALCEMIA: ICD-10-CM

## 2025-06-18 PROCEDURE — 99215 OFFICE O/P EST HI 40 MIN: CPT | Performed by: INTERNAL MEDICINE

## 2025-06-18 PROCEDURE — G2211 COMPLEX E/M VISIT ADD ON: HCPCS | Performed by: INTERNAL MEDICINE

## 2025-06-18 RX ORDER — TIZANIDINE 2 MG/1
TABLET ORAL AS NEEDED
COMMUNITY
Start: 2025-05-06

## 2025-06-18 RX ORDER — LEVOTHYROXINE SODIUM 25 UG/1
25 TABLET ORAL DAILY
Qty: 90 TABLET | Refills: 1 | Status: SHIPPED | OUTPATIENT
Start: 2025-06-18

## 2025-06-18 NOTE — ASSESSMENT & PLAN NOTE
Orders:    T4, free; Future    TSH, 3rd generation; Future    Comprehensive metabolic panel; Future    CBC and differential; Future    T4, free; Future    Hemoglobin A1C; Future    TSH, 3rd generation; Future    levothyroxine 25 mcg tablet; Take 1 tablet (25 mcg total) by mouth daily

## 2025-06-18 NOTE — PATIENT INSTRUCTIONS
Hgba1c is 6.1%. this is excellent.     Continue the same metformin 500 mg twice a day.     The thyroid is underactive now.     Let's start low dose levothyroxine 25 mcg daily. Take on empty stomach and wait at least 30 min to eat.can take 2 hours after a meal.  Any supplements, vitamins, metamucil at least 3-4 hours away from the levothyroxine.     Follow up in 6 months with blood work.     We'll recheck thyroid blood work in 3 months.

## 2025-06-18 NOTE — ASSESSMENT & PLAN NOTE
Orders:    Comprehensive metabolic panel; Future    CBC and differential; Future    T4, free; Future    Hemoglobin A1C; Future    TSH, 3rd generation; Future

## 2025-06-18 NOTE — PROGRESS NOTES
Name: Valerie Macedo      : 1955      MRN: [id number]  Encounter Provider: Bernadine Rivas MD  Encounter Date: 2025   Encounter department: [facility]    No chief complaint on file.  :  Assessment & Plan  Type 2 diabetes mellitus without complication, without long-term current use of insulin (HCC)    Lab Results   Component Value Date    HGBA1C 6.1 (H) 2025       Orders:    Comprehensive metabolic panel; Future    CBC and differential; Future    T4, free; Future    Hemoglobin A1C; Future    TSH, 3rd generation; Future    Diabetic polyneuropathy associated with type 2 diabetes mellitus (HCC)    Lab Results   Component Value Date    HGBA1C 6.1 (H) 2025       Orders:    Comprehensive metabolic panel; Future    CBC and differential; Future    T4, free; Future    Hemoglobin A1C; Future    TSH, 3rd generation; Future    Goiter, nontoxic, multinodular    Orders:    Comprehensive metabolic panel; Future    CBC and differential; Future    T4, free; Future    Hemoglobin A1C; Future    TSH, 3rd generation; Future    Status post partial thyroidectomy    Orders:    Comprehensive metabolic panel; Future    CBC and differential; Future    T4, free; Future    Hemoglobin A1C; Future    TSH, 3rd generation; Future    Hypercalcemia    Orders:    Comprehensive metabolic panel; Future    CBC and differential; Future    T4, free; Future    Hemoglobin A1C; Future    TSH, 3rd generation; Future    Postoperative hypothyroidism    Orders:    T4, free; Future    TSH, 3rd generation; Future    Comprehensive metabolic panel; Future    CBC and differential; Future    T4, free; Future    Hemoglobin A1C; Future    TSH, 3rd generation; Future    levothyroxine 25 mcg tablet; Take 1 tablet (25 mcg total) by mouth daily      Assessment & Plan  1. Type 2 diabetes mellitus.  - Hemoglobin A1c level is commendably at 6.1%, indicating good glycemic control.  - Urine test  results do not suggest any diabetic impact on renal function.  - Cholesterol levels are satisfactory, with an LDL level of 106, slightly above the target of 100. Liver and kidney functions are within normal limits.  - She will maintain her current regimen of metformin 500 mg twice daily.    2. Hypothyroidism post partial thyroidectomy.  - Thyroid function is currently suboptimal, likely due to the partial thyroidectomy she underwent. TSH levels have increased from 2 to 7 post-surgery.  - The small nodule on the left side is suspected to be a parathyroid adenoma, which is not causing any issues or elevating calcium or parathyroid hormone levels.  - The right side previously had at least two nodules, which have been excised.  - She will commence levothyroxine therapy at a dose of 25 mcg daily, to be taken on an empty stomach with a minimum of 30 minutes before eating. Any supplements, vitamins, Metamucil, or fiber should be taken at least 3 to 4 hours apart from the levothyroxine. Thyroid blood work will be repeated in approximately 3 months to monitor progress and adjust the treatment plan as necessary.    3.  Diabetic neuropathy.  She has chronic neuropathic symptoms.  Diabetic foot exams are up-to-date.    4.  Hypercalcemia.  There is a suspicion of a parathyroid adenoma.  Most recent calcium levels have normalized.  As long as calcium is stable, no further treatment is necessary.    Follow-up: The patient will follow up in 6 months with preceding hemoglobin A1c, CMP, CBC, TSH, and free T4.    She will get a TSH and free T4 performed in 2 to 3 months.    I have spent a total time of 42 minutes in caring for this patient on the day of the visit/encounter including Diagnostic results, Prognosis, Risks and benefits of tx options, Instructions for management, Patient and family education, Importance of tx compliance, Impressions, Counseling / Coordination of care, Documenting in the medical record, Reviewing/placing  orders in the medical record (including tests, medications, and/or procedures), and Obtaining or reviewing history  .      Pertinent Medical History   Valerie Macedo  is a 69-year-old female with a history of type 2 diabetes with neuropathy diagnosed 3 years ago, nontoxic multinodular goiter, and hypercalcemia.    Diabetes complications include neuropathy as she reports no nephropathy, retinopathy, heart attack, stroke, or claudication.     She was diagnosed with a neuroendocrine tumor of the pancreas in 2008 and 40% of her pancreas was removed, both the body and the tail.  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  She had been prediabetic for many years but developed diabetes post the surgery in October 2021.  She is utilizing octreotide for this cancer.     She has a history of a nontoxic multinodular goiter with a right sided dominant thyroid nodule.This was noted on thyroid ultrasound in 2008 in the workup of a neuroendocrine tumor of the pancreas.  40% of her pancreas had been removed and a PET scan was done noting uptake in the thyroid.  This occurred in November 2021 and a biopsy was performed demonstrating a Blain class 3 lesion with Afirma testing that was benign on the T rad 4 3.1 cm right-sided thyroid nodule.  Of note, there is a TI-RADS 5 nodule measuring less than 1 cm in the thyroid also. The most recent thyroid ultrasound in January 2024 revealed an enlargement of her thyroid nodule up to 4.2 cm on the right with a second TI-RADS 1.4 cm thyroid nodule in the right. A fine needle aspiration biopsy of both nodules at [place], which currently reads Blain class III with suspicious Afirma testing.    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      History of Present Illness   History of Present  Illness  Valerie Macedo  is a 69-year-old female with a history of type 2 diabetes with neuropathy diagnosed 3 years ago, nontoxic multinodular goiter, and hypercalcemia, here for a follow-up visit.     Diabetes complications include neuropathy as she reports no nephropathy, retinopathy, heart attack, stroke, or claudication.     She has a history of multiple thyroid nodules, which were biopsied on 05/15/2023 at [place]. The first biopsy in the past was inconclusive, and the second one was also inconclusive. She has a history of mild hypercalcemia for which a thyroid ultrasound did demonstrate a possible lesion inferior to the left lobe of the thyroid that could be a parathyroid adenoma, but the calcium normalized with a normal parathyroid hormone level.     She is currently on metformin 500 mg twice daily and monthly octreotide injections. She reports no significant increase in urination but does experience nighttime urination once or twice. She experiences thirst but manages it by drinking water frequently. She reports no blurry vision and has not had an eye examination recently. She experiences numbness in her feet, which is a chronic issue, and notes increased swelling in her feet. She does not see a podiatrist. She reports no chest pain or shortness of breath.     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 She reports feeling cold more often than hot and does not experience tremors, shaky hands, or heart palpitations. She is not currently on any thyroid medication. She experiences both diarrhea and constipation. She has had her gallbladder removed and does not tolerate vegetables well. She experiences insomnia, both difficulty falling asleep and waking up during the night, and often feels fatigued. She reports no significant dry skin but has noticed hair thinning, which has worsened  "over time. Her weight remains stable. She continues to take turmeric and Metamucil.     PAST SURGICAL HISTORY:  Right thyroid lobectomy and isthmus removal in 01/2025      Home blood glucometer readings:   Not really testing blood sugars. She has checked her blood sugar levels a few times since her last visit, which were within normal limits.     Current diabetic regimen:   She takes metformin 500 mg twice a day for diabetes management.      Review of Systems as per HPI    Medical History Reviewed by provider this encounter:  Tobacco  Allergies  Meds  Problems  Med Hx  Surg Hx  Fam Hx     .  Medications Ordered Prior to Encounter[1]   Social History[2]     Medical History Reviewed by provider this encounter:  Tobacco  Allergies  Meds  Problems  Med Hx  Surg Hx  Fam Hx     .    Objective   /82   Pulse 79   Ht 5' 1\" (1.549 m)   Wt 78.8 kg (173 lb 12.8 oz)   SpO2 100%   BMI 32.84 kg/m²      Body mass index is 32.84 kg/m².  Wt Readings from Last 3 Encounters:   06/18/25 78.8 kg (173 lb 12.8 oz)   12/17/24 78.8 kg (173 lb 12.8 oz)   06/18/24 78.3 kg (172 lb 9.6 oz)     Physical Exam    Physical Exam  Vitals and nursing note reviewed.   Constitutional:       General: She is not in acute distress.     Appearance: Normal appearance. She is well-developed.   HENT:      Head: Normocephalic and atraumatic.     Eyes:      Conjunctiva/sclera: Conjunctivae normal.      Comments: No lid lag, stare, proptosis, or periorbital edema.   Neck:      Vascular: No carotid bruit.      Comments: Healed anterior neck scar. Palpable left thyroid tissue without nodules, no palpable right thyroid tissue.   Cardiovascular:      Rate and Rhythm: Normal rate and regular rhythm.      Heart sounds: Normal heart sounds. No murmur heard.  Pulmonary:      Effort: Pulmonary effort is normal. No respiratory distress.      Breath sounds: Normal breath sounds. No wheezing.   Abdominal:      Palpations: Abdomen is soft. "     Musculoskeletal:         General: No swelling.      Cervical back: Neck supple.      Right lower leg: Edema present.      Left lower leg: Edema present.      Comments: Trace - 1+ bilateral lower extremity edema, left greater than right.  No tremor of the outstretched hands.     Skin:     General: Skin is warm and dry.      Capillary Refill: Capillary refill takes less than 2 seconds.     Neurological:      Mental Status: She is alert and oriented to person, place, and time.      Comments: Patellar deep tendon reflexes normal.   Psychiatric:         Mood and Affect: Mood normal.       Last Eye Exam: 10/23/2023  Last Foot Exam: 12/17/2024  Health Maintenance   Topic Date Due    Diabetic Eye Exam  10/23/2025    Diabetic Foot Exam  12/17/2025       Results    Labs: I have reviewed pertinent labs including:   Lab Results   Component Value Date    HGBA1C 6.1 (H) 06/12/2025    HGBA1C 6.5 (H) 12/11/2024    HGBA1C 6.2 (H) 06/11/2024      Blood work performed on 6/12/2025 showed a TSH of 7.25 with a free T4 of 0.98.    Urine microalbumin to creatinine ratio was less than 10.    Total cholesterol 182, LDL cholesterol 106.    CMP showed glucose of 118 fasting but was otherwise normal.    Lab Results   Component Value Date    CREATININE 0.81 06/12/2025    CREATININE 0.90 12/11/2024    CREATININE 0.76 06/11/2024    BUN 13 06/12/2025    K 4.0 06/12/2025     06/12/2025    CO2 23 06/12/2025      eGFR   Date Value Ref Range Status   06/12/2025 78 >59 mL/min/1.73 Final      HDL   Date Value Ref Range Status   06/12/2025 57 >39 mg/dL Final     Triglycerides   Date Value Ref Range Status   06/12/2025 107 0 - 149 mg/dL Final      ALT   Date Value Ref Range Status   06/12/2025 15 0 - 32 IU/L Final     ALANINE AMINOTRANSFERASE   Date Value Ref Range Status   01/10/2022 27 0 - 33 U/L Final     AST   Date Value Ref Range Status   06/12/2025 20 0 - 40 IU/L Final     ASPAR AMINOTRANSFERASE   Date Value Ref Range Status   01/10/2022  36 (H) 0 - 30 U/L Final     Alkaline Phosphatase   Date Value Ref Range Status   01/10/2022 141 53 - 141 U/L Final         Lab Results   Component Value Date    FREET4 0.98 06/12/2025           Patient Instructions   Hgba1c is 6.1%. this is excellent.     Continue the same metformin 500 mg twice a day.     The thyroid is underactive now.     Let's start low dose levothyroxine 25 mcg daily. Take on empty stomach and wait at least 30 min to eat.can take 2 hours after a meal.  Any supplements, vitamins, metamucil at least 3-4 hours away from the levothyroxine.     Follow up in 6 months with blood work.     We'll recheck thyroid blood work in 3 months.     Discussed with the patient and all questioned fully answered. She will call me if any problems arise.           [1]   Current Outpatient Medications on File Prior to Visit   Medication Sig Dispense Refill    Ascorbic Acid (vitamin C) 1000 MG tablet Take 1,000 mg by mouth in the morning.      Blood Glucose Monitoring Suppl (OneTouch Verio) w/Device KIT Use to test blood sugars once daily 1 kit 0    carboxymethylcellulose 1 % ophthalmic solution Apply 1 drop to eye      Glucosamine-Chondroitin (GLUCOSAMINE CHONDR COMPLEX PO) Take by mouth      glucose blood test strip Use  To test blood sugars once daily 100 strip 3    metFORMIN (GLUCOPHAGE) 500 mg tablet TAKE 1 TABLET BY MOUTH TWICE A DAY WITH MEALS 180 tablet 1    Multiple Vitamins-Minerals (CENTRUM SILVER 50+WOMEN PO) Take 1 tablet by mouth in the morning.      octreotide (SandoSTATIN LAR DEPOT) 30 mg IM injection kit Inject 30 mg into a muscle every 28 days      OneTouch Delica Lancets 30G MISC Use to test blood sugars once daily 100 each 3    Probiotic Product (PROBIOTIC-10 PO) Take 1 tablet by mouth in the morning.      tiZANidine (ZANAFLEX) 2 mg tablet as needed      Turmeric 500 MG CAPS Take by mouth      hydrocortisone (ANUSOL-HC) 2.5 % rectal cream 1 Application Every 12 hours      psyllium (METAMUCIL) 0.52 g  capsule Take 0.52 g by mouth daily (Patient not taking: Reported on 6/18/2025)       No current facility-administered medications on file prior to visit.   [2]   Social History  Tobacco Use    Smoking status: Never    Smokeless tobacco: Never   Vaping Use    Vaping status: Never Used   Substance and Sexual Activity    Alcohol use: Yes     Comment: Occasionally    Drug use: Never    Sexual activity: Yes     Partners: Male     Birth control/protection: Post-menopausal

## 2025-06-18 NOTE — ASSESSMENT & PLAN NOTE
Lab Results   Component Value Date    HGBA1C 6.1 (H) 06/12/2025       Orders:    Comprehensive metabolic panel; Future    CBC and differential; Future    T4, free; Future    Hemoglobin A1C; Future    TSH, 3rd generation; Future